# Patient Record
Sex: MALE | Race: BLACK OR AFRICAN AMERICAN | NOT HISPANIC OR LATINO | URBAN - METROPOLITAN AREA
[De-identification: names, ages, dates, MRNs, and addresses within clinical notes are randomized per-mention and may not be internally consistent; named-entity substitution may affect disease eponyms.]

---

## 2023-05-08 ENCOUNTER — OFFICE VISIT (OUTPATIENT)
Dept: PRIMARY CARE | Facility: CLINIC | Age: 68
End: 2023-05-08
Payer: COMMERCIAL

## 2023-05-08 VITALS
SYSTOLIC BLOOD PRESSURE: 114 MMHG | HEART RATE: 73 BPM | BODY MASS INDEX: 20.52 KG/M2 | WEIGHT: 143 LBS | DIASTOLIC BLOOD PRESSURE: 70 MMHG

## 2023-05-08 DIAGNOSIS — Z00.00 HEALTH CARE MAINTENANCE: ICD-10-CM

## 2023-05-08 DIAGNOSIS — N18.31 STAGE 3A CHRONIC KIDNEY DISEASE (MULTI): ICD-10-CM

## 2023-05-08 DIAGNOSIS — Z12.5 SCREENING PSA (PROSTATE SPECIFIC ANTIGEN): ICD-10-CM

## 2023-05-08 DIAGNOSIS — E46 PROTEIN-CALORIE MALNUTRITION, UNSPECIFIED SEVERITY (MULTI): ICD-10-CM

## 2023-05-08 DIAGNOSIS — D70.8 OTHER NEUTROPENIA (CMS-HCC): Primary | ICD-10-CM

## 2023-05-08 DIAGNOSIS — L40.9 PSORIASIS: ICD-10-CM

## 2023-05-08 DIAGNOSIS — J45.20 MILD INTERMITTENT ASTHMA, UNSPECIFIED WHETHER COMPLICATED (HHS-HCC): ICD-10-CM

## 2023-05-08 DIAGNOSIS — I10 BENIGN ESSENTIAL HYPERTENSION: ICD-10-CM

## 2023-05-08 PROBLEM — E27.8 ADRENAL INCIDENTALOMA (MULTI): Status: ACTIVE | Noted: 2021-12-21

## 2023-05-08 PROBLEM — E78.5 DYSLIPIDEMIA: Status: ACTIVE | Noted: 2023-05-08

## 2023-05-08 PROBLEM — E27.8 ADRENAL INCIDENTALOMA (MULTI): Status: ACTIVE | Noted: 2023-05-08

## 2023-05-08 PROBLEM — E27.8 ADRENAL INCIDENTALOMA (MULTI): Status: RESOLVED | Noted: 2021-12-21 | Resolved: 2023-05-08

## 2023-05-08 LAB
ALANINE AMINOTRANSFERASE (SGPT) (U/L) IN SER/PLAS: 29 U/L (ref 10–52)
ALBUMIN (G/DL) IN SER/PLAS: 4.7 G/DL (ref 3.4–5)
ALKALINE PHOSPHATASE (U/L) IN SER/PLAS: 70 U/L (ref 33–136)
ANION GAP IN SER/PLAS: 11 MMOL/L (ref 10–20)
ASPARTATE AMINOTRANSFERASE (SGOT) (U/L) IN SER/PLAS: 28 U/L (ref 9–39)
BILIRUBIN TOTAL (MG/DL) IN SER/PLAS: 1.8 MG/DL (ref 0–1.2)
CALCIUM (MG/DL) IN SER/PLAS: 9.5 MG/DL (ref 8.6–10.6)
CARBON DIOXIDE, TOTAL (MMOL/L) IN SER/PLAS: 26 MMOL/L (ref 21–32)
CHLORIDE (MMOL/L) IN SER/PLAS: 107 MMOL/L (ref 98–107)
CHOLESTEROL (MG/DL) IN SER/PLAS: 124 MG/DL (ref 0–199)
CHOLESTEROL IN HDL (MG/DL) IN SER/PLAS: 46 MG/DL
CHOLESTEROL/HDL RATIO: 2.7
CREATININE (MG/DL) IN SER/PLAS: 1.39 MG/DL (ref 0.5–1.3)
ERYTHROCYTE DISTRIBUTION WIDTH (RATIO) BY AUTOMATED COUNT: 11.6 % (ref 11.5–14.5)
ERYTHROCYTE MEAN CORPUSCULAR HEMOGLOBIN CONCENTRATION (G/DL) BY AUTOMATED: 31.7 G/DL (ref 32–36)
ERYTHROCYTE MEAN CORPUSCULAR VOLUME (FL) BY AUTOMATED COUNT: 94 FL (ref 80–100)
ERYTHROCYTES (10*6/UL) IN BLOOD BY AUTOMATED COUNT: 4 X10E12/L (ref 4.5–5.9)
GFR MALE: 55 ML/MIN/1.73M2
GLUCOSE (MG/DL) IN SER/PLAS: 95 MG/DL (ref 74–99)
HEMATOCRIT (%) IN BLOOD BY AUTOMATED COUNT: 37.5 % (ref 41–52)
HEMOGLOBIN (G/DL) IN BLOOD: 11.9 G/DL (ref 13.5–17.5)
LDL: 67 MG/DL (ref 0–99)
LEUKOCYTES (10*3/UL) IN BLOOD BY AUTOMATED COUNT: 2.8 X10E9/L (ref 4.4–11.3)
NRBC (PER 100 WBCS) BY AUTOMATED COUNT: 0 /100 WBC (ref 0–0)
PLATELETS (10*3/UL) IN BLOOD AUTOMATED COUNT: 194 X10E9/L (ref 150–450)
POTASSIUM (MMOL/L) IN SER/PLAS: 4.4 MMOL/L (ref 3.5–5.3)
PROSTATE SPECIFIC ANTIGEN,SCREEN: 3.36 NG/ML (ref 0–4)
PROTEIN TOTAL: 7.6 G/DL (ref 6.4–8.2)
SODIUM (MMOL/L) IN SER/PLAS: 140 MMOL/L (ref 136–145)
THYROTROPIN (MIU/L) IN SER/PLAS BY DETECTION LIMIT <= 0.05 MIU/L: 2.33 MIU/L (ref 0.44–3.98)
TRIGLYCERIDE (MG/DL) IN SER/PLAS: 53 MG/DL (ref 0–149)
UREA NITROGEN (MG/DL) IN SER/PLAS: 18 MG/DL (ref 6–23)
VLDL: 11 MG/DL (ref 0–40)

## 2023-05-08 PROCEDURE — 84153 ASSAY OF PSA TOTAL: CPT

## 2023-05-08 PROCEDURE — 1036F TOBACCO NON-USER: CPT | Performed by: INTERNAL MEDICINE

## 2023-05-08 PROCEDURE — 85027 COMPLETE CBC AUTOMATED: CPT

## 2023-05-08 PROCEDURE — 84443 ASSAY THYROID STIM HORMONE: CPT

## 2023-05-08 PROCEDURE — 3074F SYST BP LT 130 MM HG: CPT | Performed by: INTERNAL MEDICINE

## 2023-05-08 PROCEDURE — 80061 LIPID PANEL: CPT

## 2023-05-08 PROCEDURE — 99214 OFFICE O/P EST MOD 30 MIN: CPT | Performed by: INTERNAL MEDICINE

## 2023-05-08 PROCEDURE — 1159F MED LIST DOCD IN RCRD: CPT | Performed by: INTERNAL MEDICINE

## 2023-05-08 PROCEDURE — 80053 COMPREHEN METABOLIC PANEL: CPT

## 2023-05-08 PROCEDURE — 3078F DIAST BP <80 MM HG: CPT | Performed by: INTERNAL MEDICINE

## 2023-05-08 PROCEDURE — 1160F RVW MEDS BY RX/DR IN RCRD: CPT | Performed by: INTERNAL MEDICINE

## 2023-05-08 RX ORDER — AMLODIPINE BESYLATE 5 MG/1
5 TABLET ORAL
COMMUNITY
Start: 2022-06-17 | End: 2023-05-09 | Stop reason: SDUPTHER

## 2023-05-08 RX ORDER — ATORVASTATIN CALCIUM 40 MG/1
40 TABLET, FILM COATED ORAL NIGHTLY
COMMUNITY
End: 2023-08-14 | Stop reason: SDUPTHER

## 2023-05-08 RX ORDER — ALBUTEROL SULFATE 90 UG/1
2 AEROSOL, METERED RESPIRATORY (INHALATION) EVERY 6 HOURS PRN
COMMUNITY
Start: 2019-03-12

## 2023-05-08 RX ORDER — EPINEPHRINE 0.3 MG/.3ML
0.3 INJECTION SUBCUTANEOUS AS NEEDED
COMMUNITY
Start: 2014-05-24

## 2023-05-08 RX ORDER — TRIAMCINOLONE ACETONIDE 1 MG/G
1 OINTMENT TOPICAL 2 TIMES DAILY
COMMUNITY
Start: 2022-09-21

## 2023-05-08 ASSESSMENT — PATIENT HEALTH QUESTIONNAIRE - PHQ9
2. FEELING DOWN, DEPRESSED OR HOPELESS: NOT AT ALL
1. LITTLE INTEREST OR PLEASURE IN DOING THINGS: NOT AT ALL
SUM OF ALL RESPONSES TO PHQ9 QUESTIONS 1 AND 2: 0

## 2023-05-08 NOTE — PROGRESS NOTES
Subjective   Patient ID: Arturo Gallego is a 68 y.o. male who presents for Follow-up.    HPI     Patient is a 68-year-old male with past medical history as below hypertension dyslipidemia who presents for follow-up.  Patient is compliant with his medications denies chest pain palpitations orthopnea    He has chronic kidney disease stage III and is due for renal function test.  No leg swelling.  No NSAID use     History of neutropenia.  Check CBC no recent infections.  No recent hospitalizations.    Asthma mild intermittent no recent exacerbations and has not been using his alb inhailer as not needed     Review of Systems  Constitutional: No fever or chills  Cardiovascular: no chest pain, no palpitations and no syncope.   Respiratory: no cough, no shortness of breath during exertion and no shortness of breath at rest.   Gastrointestinal: no abdominal pain, no nausea and no vomiting.  Neuro: No Headache, no dizziness    Objective   /70   Pulse 73   Wt 64.9 kg (143 lb)   BMI 20.52 kg/m²     Physical Exam  Constitutional: Alert and in no acute distress. Well developed, well nourished  Head and Face: Head and face: Normal.    Cardiovascular: Heart rate and rhythm were normal, normal S1 and S2. No peripheral edema.   Pulmonary: No respiratory distress. Clear bilateral breath sounds.  Musculoskeletal: Gait and station: Normal. Muscle strength/tone: Normal.   Skin: Normal skin color and pigmentation, normal skin turgor, and no rash.    Psychiatric: Judgment and insight: Intact. Mood and affect: Normal.        No results found for: WBC, HGB, HCT, PLT, CHOL, TRIG, HDL, LDLDIRECT, ALT, AST, NA, K, CL, CREATININE, BUN, CO2, TSH, PSA, INR, GLUF, HGBA1C, ALBUR    FOOT  Narrative: Interpreted By:  ROSE MARIE WEBB MD, MEKA  MRN: [id number]  Patient Name: ARTURO GALLEGO     STUDY:  FOOT; COMPLETE, MIN 3 VIEWS; Left;  2/15/2023 10:41 am     INDICATION:  STEPPED ON GLASS .     COMPARISON:  None.     ACCESSION  NUMBER(S):  [id number]     ORDERING CLINICIAN:  AMBREEN KENYON     FINDINGS:  AP, oblique and lateral views were obtained. No fracture or  dislocation is noted. No radiopaque foreign bodies are noted in the  soft tissues.     Impression: No acute osseous abnormality and no radiopaque foreign body               Assessment/Plan   Problem List Items Addressed This Visit          Respiratory    Asthma     Stable   No recent flares            Circulatory    Benign essential hypertension     Controlled on current meds            Genitourinary    Stage 3a chronic kidney disease     Avoid nsaid use  Drink 1.5 L fluid daily  Check rfp          Relevant Orders    CBC    Comprehensive metabolic panel    Lipid Panel    TSH with reflex to Free T4 if abnormal    Prostate Spec.Ag,Screen       Endocrine/Metabolic    Protein-calorie malnutrition, unspecified severity (CMS/HCC)    Relevant Orders    CBC    Comprehensive metabolic panel    Lipid Panel    TSH with reflex to Free T4 if abnormal    Prostate Spec.Ag,Screen       Immune    Psoriasis     Cont triamcinolone cream as needed            Other    Neutropenia (CMS/HCC) - Primary     Check cbc          Relevant Orders    CBC    Comprehensive metabolic panel    Lipid Panel    TSH with reflex to Free T4 if abnormal    Prostate Spec.Ag,Screen     Other Visit Diagnoses       Screening PSA (prostate specific antigen)        Relevant Orders    Prostate Spec.Ag,Screen    Health care maintenance        Relevant Orders    CBC    Comprehensive metabolic panel    Lipid Panel    TSH with reflex to Free T4 if abnormal    Prostate Spec.Ag,Screen

## 2023-05-09 DIAGNOSIS — I10 BENIGN ESSENTIAL HYPERTENSION: ICD-10-CM

## 2023-05-09 DIAGNOSIS — D70.9 NEUTROPENIA, UNSPECIFIED TYPE (CMS-HCC): Primary | ICD-10-CM

## 2023-05-09 RX ORDER — AMLODIPINE BESYLATE 5 MG/1
5 TABLET ORAL
Qty: 90 TABLET | Refills: 1 | Status: SHIPPED | OUTPATIENT
Start: 2023-05-09 | End: 2023-08-14 | Stop reason: SDUPTHER

## 2023-05-10 RX ORDER — AMLODIPINE BESYLATE 5 MG/1
TABLET ORAL
Qty: 90 TABLET | Refills: 0 | OUTPATIENT
Start: 2023-05-10

## 2023-08-14 ENCOUNTER — OFFICE VISIT (OUTPATIENT)
Dept: PRIMARY CARE | Facility: CLINIC | Age: 68
End: 2023-08-14
Payer: COMMERCIAL

## 2023-08-14 VITALS
SYSTOLIC BLOOD PRESSURE: 111 MMHG | HEART RATE: 72 BPM | BODY MASS INDEX: 20.37 KG/M2 | DIASTOLIC BLOOD PRESSURE: 72 MMHG | WEIGHT: 142 LBS

## 2023-08-14 DIAGNOSIS — I10 BENIGN ESSENTIAL HYPERTENSION: Primary | ICD-10-CM

## 2023-08-14 DIAGNOSIS — D70.9 NEUTROPENIA, UNSPECIFIED TYPE (CMS-HCC): ICD-10-CM

## 2023-08-14 DIAGNOSIS — Z23 NEED FOR VACCINATION AGAINST STREPTOCOCCUS PNEUMONIAE: ICD-10-CM

## 2023-08-14 DIAGNOSIS — N18.31 STAGE 3A CHRONIC KIDNEY DISEASE (MULTI): ICD-10-CM

## 2023-08-14 DIAGNOSIS — M25.511 ACUTE PAIN OF RIGHT SHOULDER: ICD-10-CM

## 2023-08-14 PROCEDURE — G0009 ADMIN PNEUMOCOCCAL VACCINE: HCPCS | Performed by: INTERNAL MEDICINE

## 2023-08-14 PROCEDURE — 1159F MED LIST DOCD IN RCRD: CPT | Performed by: INTERNAL MEDICINE

## 2023-08-14 PROCEDURE — 3078F DIAST BP <80 MM HG: CPT | Performed by: INTERNAL MEDICINE

## 2023-08-14 PROCEDURE — 1036F TOBACCO NON-USER: CPT | Performed by: INTERNAL MEDICINE

## 2023-08-14 PROCEDURE — 3074F SYST BP LT 130 MM HG: CPT | Performed by: INTERNAL MEDICINE

## 2023-08-14 PROCEDURE — 1160F RVW MEDS BY RX/DR IN RCRD: CPT | Performed by: INTERNAL MEDICINE

## 2023-08-14 PROCEDURE — 90677 PCV20 VACCINE IM: CPT | Performed by: INTERNAL MEDICINE

## 2023-08-14 PROCEDURE — 99213 OFFICE O/P EST LOW 20 MIN: CPT | Performed by: INTERNAL MEDICINE

## 2023-08-14 RX ORDER — METHYLPREDNISOLONE 4 MG/1
TABLET ORAL
Qty: 21 TABLET | Refills: 0 | Status: SHIPPED | OUTPATIENT
Start: 2023-08-14 | End: 2023-08-21

## 2023-08-14 RX ORDER — ATORVASTATIN CALCIUM 40 MG/1
40 TABLET, FILM COATED ORAL NIGHTLY
Qty: 90 TABLET | Refills: 3 | Status: SHIPPED | OUTPATIENT
Start: 2023-08-14 | End: 2024-04-01 | Stop reason: SDUPTHER

## 2023-08-14 RX ORDER — AMLODIPINE BESYLATE 5 MG/1
5 TABLET ORAL
Qty: 90 TABLET | Refills: 1 | Status: SHIPPED | OUTPATIENT
Start: 2023-08-14 | End: 2024-04-04

## 2023-08-14 ASSESSMENT — PATIENT HEALTH QUESTIONNAIRE - PHQ9
1. LITTLE INTEREST OR PLEASURE IN DOING THINGS: NOT AT ALL
2. FEELING DOWN, DEPRESSED OR HOPELESS: NOT AT ALL
SUM OF ALL RESPONSES TO PHQ9 QUESTIONS 1 AND 2: 0

## 2023-08-14 NOTE — PROGRESS NOTES
Subjective   Patient ID: Arturo Gomes is a 68 y.o. male who presents for Follow-up (Rt shoulder issues).    HPI     Patient is a 68-year-old male with past medical history as below hypertension dyslipidemia who presents for follow-up.    He has chronic kidney disease stage III . No leg swelling.  No NSAID use     History of neutropenia.  Check CBC no recent infections.  No recent hospitalizations.  He has established with hematology and has follow-up in 1 month    Asthma mild intermittent no recent exacerbations and has not been using his alb inhailer as not needed     Patient reports right-sided shoulder pain.  It began 3 weeks ago.  After waking up.  No trauma or injury.  He states that there is pain when he raises shoulder above 90 degrees.  There is point tenderness over the bicep tendon    Review of Systems  Constitutional: No fever or chills  Cardiovascular: no chest pain, no palpitations and no syncope.   Respiratory: no cough, no shortness of breath during exertion and no shortness of breath at rest.   Gastrointestinal: no abdominal pain, no nausea and no vomiting.  Neuro: No Headache, no dizziness  MSK right shoulder pain    Objective   /72   Pulse 72   Wt 64.4 kg (142 lb)   BMI 20.37 kg/m²     Physical Exam  Constitutional: Alert and in no acute distress. Well developed, well nourished  Head and Face: Head and face: Normal.    Cardiovascular: Heart rate and rhythm were normal, normal S1 and S2. No peripheral edema.   Pulmonary: No respiratory distress. Clear bilateral breath sounds.  Musculoskeletal: Gait and station: Normal. Muscle strength/tone: Normal.   Skin: Normal skin color and pigmentation, normal skin turgor, and no rash.    Psychiatric: Judgment and insight: Intact. Mood and affect: Normal.        Lab Results   Component Value Date    WBC 3.5 (L) 08/10/2023    HGB 11.7 (L) 08/10/2023    HCT 36.6 (L) 08/10/2023     08/10/2023    CHOL 124 05/08/2023    TRIG 53 05/08/2023    HDL  46.0 05/08/2023    ALT 30 08/10/2023    AST 29 08/10/2023     08/10/2023    K 3.7 08/10/2023     08/10/2023    CREATININE 1.34 (H) 08/10/2023    BUN 9 08/10/2023    CO2 26 08/10/2023    TSH 2.33 05/08/2023       FOOT  Narrative: Interpreted By:  ROSE MARIE WEBB MD, MEKA  MRN: [id number]  Patient Name: LAURENCE GALLEGO     STUDY:  FOOT; COMPLETE, MIN 3 VIEWS; Left;  2/15/2023 10:41 am     INDICATION:  STEPPED ON GLASS .     COMPARISON:  None.     ACCESSION NUMBER(S):  [id number]     ORDERING CLINICIAN:  AMBREEN KENYON     FINDINGS:  AP, oblique and lateral views were obtained. No fracture or  dislocation is noted. No radiopaque foreign bodies are noted in the  soft tissues.     Impression: No acute osseous abnormality and no radiopaque foreign body               Assessment/Plan   Problem List Items Addressed This Visit          Cardiac and Vasculature    Benign essential hypertension - Primary     Controlled on current meds         Relevant Medications    amLODIPine (Norvasc) 5 mg tablet    atorvastatin (Lipitor) 40 mg tablet       Genitourinary and Reproductive    Stage 3a chronic kidney disease (CMS/HCC)     Avoid nsaid use  Drink 1.5 L fluid daily  Check ultrasound kidney         Relevant Medications    atorvastatin (Lipitor) 40 mg tablet    Other Relevant Orders    US renal complete       Hematology and Neoplasia    Neutropenia (CMS/HCC)     Currently being worked up by hematology            Musculoskeletal and Injuries    Acute pain of right shoulder     Check imaging.  Start Medrol.  If no improvement can consider physical therapy.         Relevant Medications    methylPREDNISolone (Medrol Dospak) 4 mg tablets    Other Relevant Orders    XR shoulder right 2+ views     Other Visit Diagnoses       Need for vaccination against Streptococcus pneumoniae        Relevant Orders    Pneumococcal conjugate vaccine, 20-valent, adult (PREVNAR 20)

## 2023-10-18 ENCOUNTER — HOSPITAL ENCOUNTER (EMERGENCY)
Facility: HOSPITAL | Age: 68
Discharge: HOME | End: 2023-10-18
Payer: COMMERCIAL

## 2023-10-18 VITALS
SYSTOLIC BLOOD PRESSURE: 148 MMHG | WEIGHT: 147 LBS | RESPIRATION RATE: 18 BRPM | HEART RATE: 61 BPM | BODY MASS INDEX: 21.05 KG/M2 | OXYGEN SATURATION: 100 % | DIASTOLIC BLOOD PRESSURE: 83 MMHG | HEIGHT: 70 IN | TEMPERATURE: 97.8 F

## 2023-10-18 DIAGNOSIS — L02.31 ABSCESS OF BUTTOCK, RIGHT: Primary | ICD-10-CM

## 2023-10-18 PROCEDURE — 99283 EMERGENCY DEPT VISIT LOW MDM: CPT

## 2023-10-18 RX ORDER — SULFAMETHOXAZOLE AND TRIMETHOPRIM 800; 160 MG/1; MG/1
1 TABLET ORAL EVERY 12 HOURS
Qty: 10 TABLET | Refills: 0 | Status: SHIPPED | OUTPATIENT
Start: 2023-10-18 | End: 2023-10-23

## 2023-10-18 RX ORDER — IBUPROFEN 600 MG/1
600 TABLET ORAL EVERY 6 HOURS PRN
Qty: 28 TABLET | Refills: 0 | Status: SHIPPED | OUTPATIENT
Start: 2023-10-18 | End: 2023-10-25

## 2023-10-18 ASSESSMENT — COLUMBIA-SUICIDE SEVERITY RATING SCALE - C-SSRS
2. HAVE YOU ACTUALLY HAD ANY THOUGHTS OF KILLING YOURSELF?: NO
1. IN THE PAST MONTH, HAVE YOU WISHED YOU WERE DEAD OR WISHED YOU COULD GO TO SLEEP AND NOT WAKE UP?: NO
6. HAVE YOU EVER DONE ANYTHING, STARTED TO DO ANYTHING, OR PREPARED TO DO ANYTHING TO END YOUR LIFE?: NO

## 2023-10-18 ASSESSMENT — PAIN DESCRIPTION - LOCATION: LOCATION: RECTUM

## 2023-10-18 ASSESSMENT — PAIN SCALES - GENERAL: PAINLEVEL_OUTOF10: 7

## 2023-10-18 ASSESSMENT — PAIN - FUNCTIONAL ASSESSMENT: PAIN_FUNCTIONAL_ASSESSMENT: 0-10

## 2023-10-18 NOTE — ED NOTES
Patient ready for discharge. Reviewed discharge instructions with patient. Patient states no questions or concerns at this time. Informed patient of follow-up information. Prescription given to patient. Proper medication administration reviewed and patient states no questions associated with prescription. Patient ambulatory from ED, VSS, NAD noted at this time, ABC's intact.      Fly Hsieh RN  10/18/23 [id number]

## 2023-10-18 NOTE — ED PROVIDER NOTES
HPI   Chief Complaint   Patient presents with    Rectal Pain       This is a 68-year-old male who presents with a complaint of rectal pain he is concerned about a hemorrhoid but never had 1 in the past states his symptoms started 845 this morning.  Nothing makes it better or worse.  Denies other complaints.  No drainage no blood in his stool no constipation no nausea or vomiting                          No data recorded                Patient History   No past medical history on file.  No past surgical history on file.  No family history on file.  Social History     Tobacco Use    Smoking status: Never     Passive exposure: Never    Smokeless tobacco: Never   Substance Use Topics    Alcohol use: Never    Drug use: Never       Physical Exam   ED Triage Vitals [10/18/23 1036]   Temp Heart Rate Resp BP   36.6 °C (97.8 °F) 69 20 133/85      SpO2 Temp Source Heart Rate Source Patient Position   98 % Temporal Monitor Sitting      BP Location FiO2 (%)     Left arm --       Physical Exam  Vitals reviewed.   Constitutional:       General: He is not in acute distress.     Appearance: Normal appearance. He is normal weight. He is not ill-appearing, toxic-appearing or diaphoretic.   HENT:      Head: Normocephalic and atraumatic.      Right Ear: External ear normal.      Left Ear: External ear normal.      Nose: Nose normal.      Mouth/Throat:      Mouth: Mucous membranes are moist.   Eyes:      Extraocular Movements: Extraocular movements intact.      Conjunctiva/sclera: Conjunctivae normal.      Pupils: Pupils are equal, round, and reactive to light.   Pulmonary:      Effort: Pulmonary effort is normal. No respiratory distress.      Breath sounds: No stridor.   Abdominal:      General: There is no distension.   Musculoskeletal:         General: No swelling or deformity.      Cervical back: Normal range of motion. No tenderness.   Skin:     General: Skin is warm.      Capillary Refill: Capillary refill takes less than 2  seconds.      Findings: Abscess present. No rash.          Neurological:      General: No focal deficit present.      Mental Status: He is alert and oriented to person, place, and time. Mental status is at baseline.   Psychiatric:         Mood and Affect: Mood normal.         Behavior: Behavior normal.         Thought Content: Thought content normal.         Judgment: Judgment normal.         ED Course & MDM   Diagnoses as of 10/18/23 1112   Abscess of buttock, right       Medical Decision Making  Ddx: abscess, hemorrhoid, fissure.     Exam shows no hemorrhoid    Discussed abscess care, antibiotics, and followup.         Procedure  Procedures     Sanford Patiño PA-C  10/18/23 1124       Sanford Patiño PA-C  10/18/23 1127

## 2023-10-19 ENCOUNTER — TELEPHONE (OUTPATIENT)
Dept: HEMATOLOGY/ONCOLOGY | Facility: CLINIC | Age: 68
End: 2023-10-19
Payer: COMMERCIAL

## 2023-10-19 NOTE — TELEPHONE ENCOUNTER
Called pt to inquire about labs that were ordered on 9/14. It does not appear he had these drawn. I asked him to call me back to see if he had them drawn elsewhere or if he needs to come in for a lab appt. The actual orders are in the old EMR and will need to be reentered in Epic if pt chooses to come in.

## 2023-11-03 PROBLEM — D35.00 ADRENAL ADENOMA: Status: ACTIVE | Noted: 2023-11-03

## 2023-11-03 RX ORDER — ALBUTEROL SULFATE 90 UG/1
1 AEROSOL, METERED RESPIRATORY (INHALATION) EVERY 4 HOURS PRN
COMMUNITY
Start: 2019-03-12

## 2023-11-03 RX ORDER — LORATADINE 10 MG/1
10 TABLET ORAL DAILY PRN
COMMUNITY
Start: 2019-11-12

## 2023-11-06 ENCOUNTER — OFFICE VISIT (OUTPATIENT)
Dept: HEMATOLOGY/ONCOLOGY | Facility: HOSPITAL | Age: 68
End: 2023-11-06
Payer: COMMERCIAL

## 2023-11-06 ENCOUNTER — LAB (OUTPATIENT)
Dept: LAB | Facility: HOSPITAL | Age: 68
End: 2023-11-06
Payer: COMMERCIAL

## 2023-11-06 VITALS
BODY MASS INDEX: 22.68 KG/M2 | HEIGHT: 67 IN | OXYGEN SATURATION: 100 % | RESPIRATION RATE: 17 BRPM | TEMPERATURE: 97.9 F | DIASTOLIC BLOOD PRESSURE: 99 MMHG | SYSTOLIC BLOOD PRESSURE: 141 MMHG | WEIGHT: 144.5 LBS | HEART RATE: 80 BPM

## 2023-11-06 DIAGNOSIS — D72.810 LYMPHOPENIA: ICD-10-CM

## 2023-11-06 DIAGNOSIS — D64.9 ANEMIA, UNSPECIFIED TYPE: ICD-10-CM

## 2023-11-06 DIAGNOSIS — D72.810 LYMPHOPENIA: Primary | ICD-10-CM

## 2023-11-06 LAB
BASOPHILS # BLD AUTO: 0.03 X10*3/UL (ref 0–0.1)
BASOPHILS NFR BLD AUTO: 0.8 %
EOSINOPHIL # BLD AUTO: 0.05 X10*3/UL (ref 0–0.7)
EOSINOPHIL NFR BLD AUTO: 1.3 %
ERYTHROCYTE [DISTWIDTH] IN BLOOD BY AUTOMATED COUNT: 11.7 % (ref 11.5–14.5)
HCT VFR BLD AUTO: 36.1 % (ref 41–52)
HGB BLD-MCNC: 12.2 G/DL (ref 13.5–17.5)
HGB RETIC QN: 34 PG (ref 28–38)
HIV 1+2 AB+HIV1 P24 AG SERPL QL IA: NONREACTIVE
HOLD SPECIMEN: NORMAL
IMM GRANULOCYTES # BLD AUTO: 0.01 X10*3/UL (ref 0–0.7)
IMM GRANULOCYTES NFR BLD AUTO: 0.3 % (ref 0–0.9)
IMMATURE RETIC FRACTION: 10.3 %
LYMPHOCYTES # BLD AUTO: 0.71 X10*3/UL (ref 1.2–4.8)
LYMPHOCYTES NFR BLD AUTO: 18.1 %
MCH RBC QN AUTO: 30.7 PG (ref 26–34)
MCHC RBC AUTO-ENTMCNC: 33.8 G/DL (ref 32–36)
MCV RBC AUTO: 91 FL (ref 80–100)
MONOCYTES # BLD AUTO: 0.24 X10*3/UL (ref 0.1–1)
MONOCYTES NFR BLD AUTO: 6.1 %
NEUTROPHILS # BLD AUTO: 2.89 X10*3/UL (ref 1.2–7.7)
NEUTROPHILS NFR BLD AUTO: 73.4 %
NRBC BLD-RTO: 0 /100 WBCS (ref 0–0)
PLATELET # BLD AUTO: 193 X10*3/UL (ref 150–450)
RBC # BLD AUTO: 3.97 X10*6/UL (ref 4.5–5.9)
RETICS #: 0.05 X10*6/UL (ref 0.02–0.11)
RETICS/RBC NFR AUTO: 1.3 % (ref 0.5–2)
WBC # BLD AUTO: 3.9 X10*3/UL (ref 4.4–11.3)

## 2023-11-06 PROCEDURE — 1126F AMNT PAIN NOTED NONE PRSNT: CPT | Performed by: STUDENT IN AN ORGANIZED HEALTH CARE EDUCATION/TRAINING PROGRAM

## 2023-11-06 PROCEDURE — 99213 OFFICE O/P EST LOW 20 MIN: CPT | Mod: 25 | Performed by: STUDENT IN AN ORGANIZED HEALTH CARE EDUCATION/TRAINING PROGRAM

## 2023-11-06 PROCEDURE — 99213 OFFICE O/P EST LOW 20 MIN: CPT | Performed by: STUDENT IN AN ORGANIZED HEALTH CARE EDUCATION/TRAINING PROGRAM

## 2023-11-06 PROCEDURE — 1160F RVW MEDS BY RX/DR IN RCRD: CPT | Performed by: STUDENT IN AN ORGANIZED HEALTH CARE EDUCATION/TRAINING PROGRAM

## 2023-11-06 PROCEDURE — 87389 HIV-1 AG W/HIV-1&-2 AB AG IA: CPT

## 2023-11-06 PROCEDURE — 88184 FLOWCYTOMETRY/ TC 1 MARKER: CPT | Mod: TC | Performed by: STUDENT IN AN ORGANIZED HEALTH CARE EDUCATION/TRAINING PROGRAM

## 2023-11-06 PROCEDURE — 88189 FLOWCYTOMETRY/READ 16 & >: CPT | Performed by: PATHOLOGY

## 2023-11-06 PROCEDURE — 1036F TOBACCO NON-USER: CPT | Performed by: STUDENT IN AN ORGANIZED HEALTH CARE EDUCATION/TRAINING PROGRAM

## 2023-11-06 PROCEDURE — 88187 FLOWCYTOMETRY/READ 2-8: CPT | Performed by: PATHOLOGY

## 2023-11-06 PROCEDURE — 3077F SYST BP >= 140 MM HG: CPT | Performed by: STUDENT IN AN ORGANIZED HEALTH CARE EDUCATION/TRAINING PROGRAM

## 2023-11-06 PROCEDURE — 85045 AUTOMATED RETICULOCYTE COUNT: CPT

## 2023-11-06 PROCEDURE — 85025 COMPLETE CBC W/AUTO DIFF WBC: CPT

## 2023-11-06 PROCEDURE — 88188 FLOWCYTOMETRY/READ 9-15: CPT | Performed by: PATHOLOGY

## 2023-11-06 PROCEDURE — 1159F MED LIST DOCD IN RCRD: CPT | Performed by: STUDENT IN AN ORGANIZED HEALTH CARE EDUCATION/TRAINING PROGRAM

## 2023-11-06 PROCEDURE — 36415 COLL VENOUS BLD VENIPUNCTURE: CPT

## 2023-11-06 PROCEDURE — 3080F DIAST BP >= 90 MM HG: CPT | Performed by: STUDENT IN AN ORGANIZED HEALTH CARE EDUCATION/TRAINING PROGRAM

## 2023-11-06 ASSESSMENT — ENCOUNTER SYMPTOMS
LOSS OF SENSATION IN FEET: 0
DEPRESSION: 0
OCCASIONAL FEELINGS OF UNSTEADINESS: 0

## 2023-11-06 ASSESSMENT — PAIN SCALES - GENERAL: PAINLEVEL: 0-NO PAIN

## 2023-11-06 NOTE — PROGRESS NOTES
Patient ID: Arturo Gomes is a 68 y.o. male.  Referring Physician: No referring provider defined for this encounter.  Primary Care Provider: Larry Ansari DO  Visit Type: Follow Up  Diagnosis: anemia, neutropenia and lymphopenia       Subjective    HPI  68 y.o. male with a PMH significant for CKD, asthma, HTN, psoriasis (topical cream) and adrenal incidentaloma who is referred for eval of anemia and neutropenia.      Pt is asymptomatic. No B s/s, masses, no increased frequency of infections. Does not follow anyone for CKD.   Has 1 CBC, no diff on file which shows mild anemia and leukopenia. No h/o same per pt. No bleeding.   Last colo was 2yrs back and was WNL.   Mixed diet, never smoker, no ETOH. No personal h/o cancer, FMH 2 sisters breast cancer 50yrs and other 42yrs, no blood disease.   No RDA.   No CT/RT/Toxins.    Not on steroids/immunosuppressives and no new medications, herbs.     9/14/2023: pt presents for follow up.  11/06/23: no new complaints, did not get labs done after last visit. No increased frequency of infections.    Review of Systems - Oncology  10 point review of systems negative except as stated in HPI    Objective   Active Ambulatory Problems     Diagnosis Date Noted    Neutropenia (CMS/HCC) 02/02/2011    Protein-calorie malnutrition, unspecified severity (CMS/HCC) 05/08/2023    Stage 3a chronic kidney disease (CMS/HCC) 12/21/2021    Asthma 04/25/2005    Benign essential hypertension 12/21/2021    Dyslipidemia 05/08/2023    Mild intermittent asthma without complication 09/07/2021    Psoriasis 05/08/2023    Acute pain of right shoulder 08/14/2023    Adrenal adenoma 11/03/2023     Resolved Ambulatory Problems     Diagnosis Date Noted    Adrenal incidentaloma (CMS/HCC) 12/21/2021     No Additional Past Medical History     No past surgical history on file.    Social history:   Last colo was 2yrs back and was WNL.   Mixed diet, never smoker, no ETOH. No personal h/o cancer, FMH 2 sisters breast  "cancer 50yrs and other 42yrs, no blood disease.   No RDA.   No CT/RT/Toxins.    Oncology History    No history exists.       Physical Exam  Vitals reviewed.   Constitutional:       Appearance: Normal appearance.   HENT:      Head: Normocephalic and atraumatic.   Eyes:      Comments: Pallor +, no icterus    Neck:      Comments: No cervical or axillary adenopathy   Cardiovascular:      Rate and Rhythm: Normal rate and regular rhythm.      Heart sounds: No murmur heard.  Pulmonary:      Breath sounds: Normal breath sounds.   Abdominal:      General: Abdomen is flat. There is no distension.      Palpations: Abdomen is soft.      Tenderness: There is no abdominal tenderness.   Musculoskeletal:      Comments: No pedal edema    Neurological:      Mental Status: He is alert.       BSA: 1.76 meters squared  BP (!) 141/99   Pulse 80   Temp 36.6 °C (97.9 °F) (Skin)   Resp 17   Ht (S) 1.699 m (5' 6.89\")   Wt 65.5 kg (144 lb 8 oz)   SpO2 100%   BMI 22.71 kg/m²     Labs:  Lab Results   Component Value Date    WBC 3.5 (L) 08/10/2023    NEUTROABS 2.51 08/10/2023    LYMPHSABS 0.54 (L) 08/10/2023    MONOSABS 0.34 08/10/2023    EOSABS 0.08 08/10/2023    BASOSABS 0.03 08/10/2023    RBC 3.94 (L) 08/10/2023    MCV 93 08/10/2023    MCHC 32.0 08/10/2023    HGB 11.7 (L) 08/10/2023    HCT 36.6 (L) 08/10/2023     08/10/2023     Lab Results   Component Value Date    RETICCTPCT 1.1 08/10/2023      Lab Results   Component Value Date    CREATININE 1.34 (H) 08/10/2023    BUN 9 08/10/2023     08/10/2023    K 3.7 08/10/2023     08/10/2023    CO2 26 08/10/2023      Lab Results   Component Value Date    ALT 30 08/10/2023    AST 29 08/10/2023    ALKPHOS 72 08/10/2023    BILITOT 1.9 (H) 08/10/2023      Lab Results   Component Value Date    TSH 2.33 05/08/2023     Lab Results   Component Value Date    TSH 2.33 05/08/2023     Lab Results   Component Value Date    IRON 43 08/10/2023    TIBC 288 08/10/2023    FERRITIN 205 " "08/10/2023      Lab Results   Component Value Date    LXFYOPHK40 490 08/10/2023      Lab Results   Component Value Date    FOLATE 8.2 08/10/2023     No results found for: \"BUDDY\", \"RF\", \"SEDRATE\"   Lab Results   Component Value Date    CRP <0.10 08/06/2019      No results found for: \"ALOK\"  Lab Results   Component Value Date     08/10/2023     Lab Results   Component Value Date    HAPTOGLOBIN 131 08/10/2023     Lab Results   Component Value Date    SPEP ABNORMAL 08/10/2023     Lab Results   Component Value Date    IGG 1,640 (H) 08/10/2023    IGM 65 08/10/2023     08/10/2023        No components found for: \"PT\"  No results found for: \"APTT\"    Assessment/Plan    68 y.o. male with a PMH significant for CKD, asthma, HTN, psoriasis (topical cream) and adrenal incidentaloma who is referred for eval of anemia and neutropenia. Also noted to have moderate lymphopenia.      Asymptomatic pt, minimal labs in EMR, appears to have Hb ~11 and TLC ~2.9, no diff. No B s/s. No history of these issues. No new meds or immunosuppression. Has had CKD for many years, never seen a nephrologist. Exam was non-contributory. Work up showed  mild anemia, lymphopenia, hypoproiliferative retic response, elevated bili but no other evidence of hemolysis. hep B/C neg, HIV not drawn, myeloma testing from serum is negative, UPEP not done.  Plan:  - Work up for lymphopenia including flow sent back in September but pt did not have them drawn, we did contact him about this after but did not hear back. I cannot see the labs ordered any more due to lack of access to the former EMR. Appeared to have no immunoglobulin deficiency. Exam unchanged and WNL.   - based on labs will call pt re follow up.     Mark Mcclain MD    "

## 2023-11-10 LAB
CELL COUNT (BLOOD): 3.9 X10*3/UL
CELL POPULATIONS: NORMAL
DIAGNOSIS: NORMAL
FLOW DIFFERENTIAL: NORMAL
FLOW TEST ORDERED: NORMAL
LAB TEST METHOD: NORMAL
NUMBER OF CELLS COLLECTED: NORMAL PER TUBE
PATH REPORT.TOTAL CANCER: NORMAL
SIGNATURE COMMENT: NORMAL
SPECIMEN VIABILITY: NORMAL

## 2023-11-13 ENCOUNTER — APPOINTMENT (OUTPATIENT)
Dept: SURGERY | Facility: CLINIC | Age: 68
End: 2023-11-13
Payer: COMMERCIAL

## 2023-11-13 ENCOUNTER — TELEPHONE (OUTPATIENT)
Dept: HEMATOLOGY/ONCOLOGY | Facility: HOSPITAL | Age: 68
End: 2023-11-13

## 2023-11-13 NOTE — TELEPHONE ENCOUNTER
----- Message from Mark Urias MD sent at 11/10/2023  4:15 PM EST -----  Please call the patient regarding his labs, HIV is negative, work up for a primary blood process causing decreased lymphocytes is negative. His Hb has improved. Based on the fact that his lymphopenia has improved, and he remains asymptomatic. Will hold off on further testing at this time. He can follow up with his PCP. I can see him back if needed, if he is comfortable with this plan, we do not need to schedule follow up. Thanks

## 2023-11-13 NOTE — TELEPHONE ENCOUNTER
Attempted to call patient to relay message but there was no answer. Left message on voicemail to call office back.

## 2024-02-14 ENCOUNTER — OFFICE VISIT (OUTPATIENT)
Dept: PRIMARY CARE | Facility: CLINIC | Age: 69
End: 2024-02-14
Payer: COMMERCIAL

## 2024-02-14 VITALS
SYSTOLIC BLOOD PRESSURE: 117 MMHG | OXYGEN SATURATION: 96 % | HEART RATE: 63 BPM | WEIGHT: 140 LBS | BODY MASS INDEX: 22 KG/M2 | DIASTOLIC BLOOD PRESSURE: 71 MMHG

## 2024-02-14 DIAGNOSIS — N18.31 STAGE 3A CHRONIC KIDNEY DISEASE (MULTI): ICD-10-CM

## 2024-02-14 DIAGNOSIS — Z12.11 SCREEN FOR COLON CANCER: ICD-10-CM

## 2024-02-14 DIAGNOSIS — M62.838 MUSCLE SPASM: ICD-10-CM

## 2024-02-14 DIAGNOSIS — E46 PROTEIN-CALORIE MALNUTRITION, UNSPECIFIED SEVERITY (MULTI): ICD-10-CM

## 2024-02-14 DIAGNOSIS — M62.838 MUSCLE SPASM: Primary | ICD-10-CM

## 2024-02-14 DIAGNOSIS — D70.9 NEUTROPENIA, UNSPECIFIED TYPE (CMS-HCC): ICD-10-CM

## 2024-02-14 PROCEDURE — 1160F RVW MEDS BY RX/DR IN RCRD: CPT | Performed by: INTERNAL MEDICINE

## 2024-02-14 PROCEDURE — 1036F TOBACCO NON-USER: CPT | Performed by: INTERNAL MEDICINE

## 2024-02-14 PROCEDURE — 3078F DIAST BP <80 MM HG: CPT | Performed by: INTERNAL MEDICINE

## 2024-02-14 PROCEDURE — 99213 OFFICE O/P EST LOW 20 MIN: CPT | Performed by: INTERNAL MEDICINE

## 2024-02-14 PROCEDURE — 1126F AMNT PAIN NOTED NONE PRSNT: CPT | Performed by: INTERNAL MEDICINE

## 2024-02-14 PROCEDURE — 3074F SYST BP LT 130 MM HG: CPT | Performed by: INTERNAL MEDICINE

## 2024-02-14 PROCEDURE — 1123F ACP DISCUSS/DSCN MKR DOCD: CPT | Performed by: INTERNAL MEDICINE

## 2024-02-14 PROCEDURE — 1159F MED LIST DOCD IN RCRD: CPT | Performed by: INTERNAL MEDICINE

## 2024-02-14 RX ORDER — METHOCARBAMOL 500 MG/1
500 TABLET, FILM COATED ORAL 4 TIMES DAILY PRN
Qty: 21 TABLET | Refills: 0 | Status: SHIPPED | OUTPATIENT
Start: 2024-02-14 | End: 2024-02-15

## 2024-02-14 NOTE — PROGRESS NOTES
Subjective   Patient ID: Arturo Gomes is a 69 y.o. male who presents for Follow-up and Spasms.    HPI     Patient is a 68-year-old male with past medical history as below hypertension dyslipidemia who presents for sick visit    Patient states that his daughter was recently moving and he was helping.  He was lifting heavy boxes.  He developed lower back pain he cannot take ibuprofen due to chronic kidney disease stage III.  He is looking for medication to help alleviate his pain.  Pain is dull and achy and 4 out of 10 in intensity and affecting the lower back    He has chronic kidney disease stage III . No leg swelling.  No NSAID use     History of neutropenia.  Check CBC no recent infections.  No recent hospitalizations.  He has established with hematology and has follow-up in 1 month    Asthma mild intermittent no recent exacerbations and has not been using his alb inhailer as not needed         Review of Systems  Constitutional: No fever or chills  Cardiovascular: no chest pain, no palpitations and no syncope.   Respiratory: no cough, no shortness of breath during exertion and no shortness of breath at rest.   Gastrointestinal: no abdominal pain, no nausea and no vomiting.  Neuro: No Headache, no dizziness  MSK lower back pain    Objective   /71   Pulse 63   Wt 63.5 kg (140 lb)   SpO2 96%   BMI 22.00 kg/m²     Physical Exam  Constitutional: Alert and in no acute distress. Well developed, well nourished  Head and Face: Head and face: Normal.    Cardiovascular: Heart rate and rhythm were normal, normal S1 and S2. No peripheral edema.   Pulmonary: No respiratory distress. Clear bilateral breath sounds.  Musculoskeletal: Lumbar paraspinal musculature tenderness to palpation  Skin: Normal skin color and pigmentation, normal skin turgor, and no rash.    Psychiatric: Judgment and insight: Intact. Mood and affect: Normal.        Lab Results   Component Value Date    WBC 3.9 (L) 11/06/2023    HGB 12.2 (L)  11/06/2023    HCT 36.1 (L) 11/06/2023     11/06/2023    CHOL 124 05/08/2023    TRIG 53 05/08/2023    HDL 46.0 05/08/2023    ALT 30 08/10/2023    AST 29 08/10/2023     08/10/2023    K 3.7 08/10/2023     08/10/2023    CREATININE 1.34 (H) 08/10/2023    BUN 9 08/10/2023    CO2 26 08/10/2023    TSH 2.33 05/08/2023       XR shoulder right 2+ views  Narrative: Interpreted By:  YOVANY CORNELL MD  MRN: [id number]  Patient Name: LAURENCE GALLEGO     STUDY:  SHOULDER, CMPLT, MIN 2 VIEWS; Right;  8/14/2023 10:27 am     INDICATION:  shoulder pain.     COMPARISON:  None.     ACCESSION NUMBER(S):  [id number]     ORDERING CLINICIAN:  ROB AMARO     FINDINGS:  AC and glenohumeral joints are normal. No fracture dislocation or  bone lesion. Lung apex is clear.     Impression: No abnormalities.        MACRO:  None            Assessment/Plan   Problem List Items Addressed This Visit       Neutropenia (CMS/HCC)    Protein-calorie malnutrition, unspecified severity (CMS/HCC)    Stage 3a chronic kidney disease (CMS/HCC)    Relevant Orders    Follow Up In [facility] - PCP - Medicare Annual     Other Visit Diagnoses       Muscle spasm    -  Primary    Relevant Medications    methocarbamol (Robaxin) 500 mg tablet    Screen for colon cancer        Relevant Orders    Colonoscopy Screening; Average Risk Patient

## 2024-02-15 RX ORDER — TIZANIDINE 2 MG/1
2 TABLET ORAL EVERY 8 HOURS PRN
Qty: 21 TABLET | Refills: 0 | Status: SHIPPED | OUTPATIENT
Start: 2024-02-15 | End: 2024-02-22

## 2024-02-17 DIAGNOSIS — I10 BENIGN ESSENTIAL HYPERTENSION: ICD-10-CM

## 2024-02-19 RX ORDER — AMLODIPINE BESYLATE 5 MG/1
5 TABLET ORAL DAILY
Qty: 90 TABLET | Refills: 3 | OUTPATIENT
Start: 2024-02-19

## 2024-03-26 RX ORDER — SODIUM CHLORIDE, SODIUM LACTATE, POTASSIUM CHLORIDE, CALCIUM CHLORIDE 600; 310; 30; 20 MG/100ML; MG/100ML; MG/100ML; MG/100ML
20 INJECTION, SOLUTION INTRAVENOUS CONTINUOUS
Status: CANCELLED | OUTPATIENT
Start: 2024-03-26

## 2024-03-27 ENCOUNTER — HOSPITAL ENCOUNTER (OUTPATIENT)
Dept: GASTROENTEROLOGY | Facility: HOSPITAL | Age: 69
Setting detail: OUTPATIENT SURGERY
Discharge: HOME | End: 2024-03-27
Payer: COMMERCIAL

## 2024-03-27 VITALS
HEART RATE: 71 BPM | WEIGHT: 147 LBS | OXYGEN SATURATION: 97 % | SYSTOLIC BLOOD PRESSURE: 125 MMHG | TEMPERATURE: 97 F | RESPIRATION RATE: 18 BRPM | DIASTOLIC BLOOD PRESSURE: 80 MMHG | HEIGHT: 70 IN | BODY MASS INDEX: 21.05 KG/M2

## 2024-03-27 DIAGNOSIS — Z12.11 SCREEN FOR COLON CANCER: ICD-10-CM

## 2024-03-27 PROCEDURE — 2500000004 HC RX 250 GENERAL PHARMACY W/ HCPCS (ALT 636 FOR OP/ED): Performed by: STUDENT IN AN ORGANIZED HEALTH CARE EDUCATION/TRAINING PROGRAM

## 2024-03-27 PROCEDURE — 99152 MOD SED SAME PHYS/QHP 5/>YRS: CPT | Performed by: STUDENT IN AN ORGANIZED HEALTH CARE EDUCATION/TRAINING PROGRAM

## 2024-03-27 PROCEDURE — 3700000012 HC SEDATION LEVEL 5+ TIME - INITIAL 15 MINUTES 5/> YEARS

## 2024-03-27 PROCEDURE — 3700000013 HC SEDATION LEVEL 5+ TIME - EACH ADDITIONAL 15 MINUTES

## 2024-03-27 PROCEDURE — 99153 MOD SED SAME PHYS/QHP EA: CPT | Performed by: STUDENT IN AN ORGANIZED HEALTH CARE EDUCATION/TRAINING PROGRAM

## 2024-03-27 PROCEDURE — G0121 COLON CA SCRN NOT HI RSK IND: HCPCS | Performed by: STUDENT IN AN ORGANIZED HEALTH CARE EDUCATION/TRAINING PROGRAM

## 2024-03-27 PROCEDURE — 7100000009 HC PHASE TWO TIME - INITIAL BASE CHARGE

## 2024-03-27 PROCEDURE — 7100000010 HC PHASE TWO TIME - EACH INCREMENTAL 1 MINUTE

## 2024-03-27 PROCEDURE — 45378 DIAGNOSTIC COLONOSCOPY: CPT | Performed by: STUDENT IN AN ORGANIZED HEALTH CARE EDUCATION/TRAINING PROGRAM

## 2024-03-27 RX ORDER — MIDAZOLAM HYDROCHLORIDE 1 MG/ML
INJECTION, SOLUTION INTRAMUSCULAR; INTRAVENOUS AS NEEDED
Status: COMPLETED | OUTPATIENT
Start: 2024-03-27 | End: 2024-03-27

## 2024-03-27 RX ORDER — FENTANYL CITRATE 50 UG/ML
INJECTION, SOLUTION INTRAMUSCULAR; INTRAVENOUS AS NEEDED
Status: COMPLETED | OUTPATIENT
Start: 2024-03-27 | End: 2024-03-27

## 2024-03-27 RX ADMIN — FENTANYL CITRATE 25 MCG: 50 INJECTION, SOLUTION INTRAMUSCULAR; INTRAVENOUS at 15:05

## 2024-03-27 RX ADMIN — MIDAZOLAM 1 MG: 1 INJECTION INTRAMUSCULAR; INTRAVENOUS at 15:06

## 2024-03-27 RX ADMIN — MIDAZOLAM 2 MG: 1 INJECTION INTRAMUSCULAR; INTRAVENOUS at 14:51

## 2024-03-27 RX ADMIN — FENTANYL CITRATE 50 MCG: 50 INJECTION, SOLUTION INTRAMUSCULAR; INTRAVENOUS at 14:51

## 2024-03-27 ASSESSMENT — PAIN - FUNCTIONAL ASSESSMENT

## 2024-03-27 ASSESSMENT — PAIN SCALES - GENERAL
PAINLEVEL_OUTOF10: 0 - NO PAIN

## 2024-03-27 ASSESSMENT — COLUMBIA-SUICIDE SEVERITY RATING SCALE - C-SSRS
6. HAVE YOU EVER DONE ANYTHING, STARTED TO DO ANYTHING, OR PREPARED TO DO ANYTHING TO END YOUR LIFE?: NO
2. HAVE YOU ACTUALLY HAD ANY THOUGHTS OF KILLING YOURSELF?: NO
1. IN THE PAST MONTH, HAVE YOU WISHED YOU WERE DEAD OR WISHED YOU COULD GO TO SLEEP AND NOT WAKE UP?: NO

## 2024-03-27 ASSESSMENT — ENCOUNTER SYMPTOMS
GASTROINTESTINAL NEGATIVE: 1
RESPIRATORY NEGATIVE: 1
CARDIOVASCULAR NEGATIVE: 1

## 2024-03-27 NOTE — H&P
History Of Present Illness  Arturo Gmoes is a 69 y.o. male presenting with PMH CKD stage III, HLD, asthma, HTN, psoriasis, presenting for surveillance colonoscopy.     Past Medical History  Past Medical History:   Diagnosis Date    Asthma     Hypertension      Surgical History  History reviewed. No pertinent surgical history.  Social History  He reports that he has never smoked. He has never been exposed to tobacco smoke. He has never used smokeless tobacco. He reports that he does not drink alcohol and does not use drugs.    Family History  No family history on file.     Allergies  Allergies   Allergen Reactions    Peanut Shortness of breath, Swelling and Other    Other Other     Review of Systems   Respiratory: Negative.     Cardiovascular: Negative.    Gastrointestinal: Negative.      Pre-sedation Evaluation:  ASA Classification - ASA 2 - Patient with mild systemic disease with no functional limitations  Mallampati Score - II (hard and soft palate, upper portion of tonsils anduvula visible)    Physical Exam  Vitals reviewed.   Constitutional:       Appearance: Normal appearance.   HENT:      Head: Normocephalic and atraumatic.      Mouth/Throat:      Mouth: Mucous membranes are moist.      Pharynx: Oropharynx is clear.   Eyes:      Extraocular Movements: Extraocular movements intact.      Conjunctiva/sclera: Conjunctivae normal.      Pupils: Pupils are equal, round, and reactive to light.   Cardiovascular:      Rate and Rhythm: Normal rate and regular rhythm.      Pulses: Normal pulses.      Heart sounds: Normal heart sounds.   Pulmonary:      Effort: Pulmonary effort is normal. No respiratory distress.      Breath sounds: Normal breath sounds.   Abdominal:      General: Abdomen is flat. Bowel sounds are normal.      Palpations: Abdomen is soft.   Musculoskeletal:         General: No swelling. Normal range of motion.   Skin:     General: Skin is warm and dry.   Neurological:      General: No focal deficit  "present.      Mental Status: He is alert and oriented to person, place, and time.   Psychiatric:         Mood and Affect: Mood normal.         Behavior: Behavior normal.          Last Recorded Vitals  Blood pressure 135/87, pulse 60, temperature 36.1 °C (97 °F), resp. rate 18, height 1.778 m (5' 10\"), weight 66.7 kg (147 lb).     Assessment/Plan     Proceed with colonoscopy today     PTA/Current Medications:  (Not in a hospital admission)    Current Outpatient Medications   Medication Sig Dispense Refill    amLODIPine (Norvasc) 5 mg tablet Take 1 tablet (5 mg) by mouth once daily. 90 tablet 1    atorvastatin (Lipitor) 40 mg tablet Take 1 tablet (40 mg) by mouth once daily at bedtime. 90 tablet 3    loratadine (Claritin) 10 mg tablet Take 1 tablet (10 mg) by mouth once daily as needed for allergies.      albuterol (Ventolin HFA) 90 mcg/actuation inhaler Inhale 2 puffs every 6 hours if needed for shortness of breath.      albuterol 90 mcg/actuation inhaler Inhale 1 puff every 4 hours if needed.      EPINEPHrine 0.3 mg/0.3 mL injection syringe Inject 0.3 mL (0.3 mg) into the shoulder, thigh, or buttocks if needed for anaphylaxis.      tiZANidine (Zanaflex) 2 mg tablet Take 1 tablet (2 mg) by mouth every 8 hours if needed for muscle spasms for up to 7 days. 21 tablet 0    triamcinolone (Kenalog) 0.1 % ointment Apply 1 Application topically 2 times a day.       No current facility-administered medications for this encounter.     Kelly Hermosillo MD  "

## 2024-03-28 ASSESSMENT — PAIN SCALES - GENERAL: PAINLEVEL_OUTOF10: 0 - NO PAIN

## 2024-04-01 ENCOUNTER — OFFICE VISIT (OUTPATIENT)
Dept: PRIMARY CARE | Facility: CLINIC | Age: 69
End: 2024-04-01
Payer: COMMERCIAL

## 2024-04-01 VITALS
HEIGHT: 60 IN | WEIGHT: 144 LBS | SYSTOLIC BLOOD PRESSURE: 138 MMHG | BODY MASS INDEX: 28.27 KG/M2 | OXYGEN SATURATION: 95 % | HEART RATE: 68 BPM | DIASTOLIC BLOOD PRESSURE: 84 MMHG

## 2024-04-01 DIAGNOSIS — M79.672 LEFT FOOT PAIN: ICD-10-CM

## 2024-04-01 DIAGNOSIS — J45.20 MILD INTERMITTENT ASTHMA WITHOUT COMPLICATION (HHS-HCC): ICD-10-CM

## 2024-04-01 DIAGNOSIS — Z00.00 HEALTH CARE MAINTENANCE: ICD-10-CM

## 2024-04-01 DIAGNOSIS — N18.31 STAGE 3A CHRONIC KIDNEY DISEASE (MULTI): ICD-10-CM

## 2024-04-01 DIAGNOSIS — I10 BENIGN ESSENTIAL HYPERTENSION: ICD-10-CM

## 2024-04-01 DIAGNOSIS — D70.9 NEUTROPENIA, UNSPECIFIED TYPE (CMS-HCC): ICD-10-CM

## 2024-04-01 DIAGNOSIS — I10 HYPERTENSION, UNSPECIFIED TYPE: ICD-10-CM

## 2024-04-01 DIAGNOSIS — J45.20 MILD INTERMITTENT ASTHMA, UNSPECIFIED WHETHER COMPLICATED (HHS-HCC): ICD-10-CM

## 2024-04-01 DIAGNOSIS — Z00.00 ROUTINE GENERAL MEDICAL EXAMINATION AT HEALTH CARE FACILITY: Primary | ICD-10-CM

## 2024-04-01 PROBLEM — M25.511 ACUTE PAIN OF RIGHT SHOULDER: Status: RESOLVED | Noted: 2023-08-14 | Resolved: 2024-04-01

## 2024-04-01 PROCEDURE — G0439 PPPS, SUBSEQ VISIT: HCPCS | Performed by: INTERNAL MEDICINE

## 2024-04-01 PROCEDURE — 1123F ACP DISCUSS/DSCN MKR DOCD: CPT | Performed by: INTERNAL MEDICINE

## 2024-04-01 PROCEDURE — 1170F FXNL STATUS ASSESSED: CPT | Performed by: INTERNAL MEDICINE

## 2024-04-01 PROCEDURE — 3075F SYST BP GE 130 - 139MM HG: CPT | Performed by: INTERNAL MEDICINE

## 2024-04-01 PROCEDURE — 1036F TOBACCO NON-USER: CPT | Performed by: INTERNAL MEDICINE

## 2024-04-01 PROCEDURE — 3078F DIAST BP <80 MM HG: CPT | Performed by: INTERNAL MEDICINE

## 2024-04-01 PROCEDURE — 99397 PER PM REEVAL EST PAT 65+ YR: CPT | Performed by: INTERNAL MEDICINE

## 2024-04-01 PROCEDURE — 1159F MED LIST DOCD IN RCRD: CPT | Performed by: INTERNAL MEDICINE

## 2024-04-01 PROCEDURE — 1160F RVW MEDS BY RX/DR IN RCRD: CPT | Performed by: INTERNAL MEDICINE

## 2024-04-01 RX ORDER — ATORVASTATIN CALCIUM 40 MG/1
40 TABLET, FILM COATED ORAL NIGHTLY
Qty: 90 TABLET | Refills: 3 | Status: SHIPPED | OUTPATIENT
Start: 2024-04-01

## 2024-04-01 ASSESSMENT — PATIENT HEALTH QUESTIONNAIRE - PHQ9
SUM OF ALL RESPONSES TO PHQ9 QUESTIONS 1 AND 2: 0
1. LITTLE INTEREST OR PLEASURE IN DOING THINGS: NOT AT ALL
2. FEELING DOWN, DEPRESSED OR HOPELESS: NOT AT ALL

## 2024-04-01 ASSESSMENT — ACTIVITIES OF DAILY LIVING (ADL)
DOING_HOUSEWORK: INDEPENDENT
BATHING: INDEPENDENT
MANAGING_FINANCES: INDEPENDENT
DRESSING: INDEPENDENT
TAKING_MEDICATION: INDEPENDENT
GROCERY_SHOPPING: INDEPENDENT

## 2024-04-01 NOTE — PROGRESS NOTES
Subjective   Patient ID: Arturo Gomes is a 69 y.o. male who presents for Medicare Annual Wellness Visit Subsequent.    Past Medical, Surgical, and Family History reviewed and updated in chart.    Reviewed all medications by prescribing practitioner or clinical pharmacist (such as prescriptions, OTCs, herbal therapies and supplements) and documented in the medical record.        Patient Care Team:  Larry Ansari DO as PCP - General (Internal Medicine)  Mark Urias MD as Consulting Physician (Hematology and Oncology)     HPI     Patient is a 69-year-old male with past medical history of stage III chronic kidney disease hypertension asthma psoriasis who presents for wellness.  Patient plans on going back to work and needs forms filled out.  No record of MMR.  Needs titers drawn.  No complaints at this time.  Takes his medications as prescribed.  Blood pressure slightly elevated today.  No headache change in vision orthopnea.  No complaints.  Lives at home with his wife.  Able to do all of his activities of daily living.  No recent falls or injury.  Denies illicit substances smoking or alcohol.  Follows with hematology due to elevated kappa lambda and was advised to follow-up as needed.  No complaints today    Review of Systems  Constitutional: No fever or chills, No Night Sweats  Eyes: No Blurry Vision or Eye sight problems  ENT: No Nasal Discharge, Hoarseness, sore throat  Cardiovascular: no chest pain, no palpitations and no syncope.   Respiratory: no cough, no shortness of breath during exertion and no shortness of breath at rest.   Gastrointestinal: no abdominal pain, no nausea and no vomiting.   : No issues with urinary stream, burning with urination, no blood in urine or stools  Skin: No Skin rashes or Lesions  Neuro: No Headache, no dizziness or Numbness or tingling  Psych: No Anxiety, depression or sleeping problems  Heme: No Easy bleeding or brusing.     Objective   /84   Pulse 68   Ht 1.473  "m (4' 10\")   Wt 65.3 kg (144 lb)   SpO2 95%   BMI 30.10 kg/m²     Physical Exam  Constitutional: Alert and in no acute distress. Well developed, well nourished.   Head and Face: Head and face: Normal.    Eyes: Normal external exam. Pupils were equal in size, round, reactive to light (PERRL) with normal accommodation and extraocular movements intact (EOMI).   Ears, Nose, Mouth, and Throat: External inspection of ears and nose: Normal.  Hearing: Normal.  Nasal mucosa, septum, and turbinates: Normal.  Lips, teeth, and gums: Normal.  Oropharynx: Normal.   Neck: No neck mass was observed. Supple. Thyroid not enlarged and there were no palpable thyroid nodules.   Cardiovascular: Heart rate and rhythm were normal, normal S1 and S2. Pedal pulses: Normal. No peripheral edema.   Pulmonary: No respiratory distress. Clear bilateral breath sounds.   Abdomen: Soft nontender; no abdominal mass palpated. Normal bowel sounds. No organomegaly.   : Deferred  Musculoskeletal: No joint swelling seen, normal movements of all extremities. Range of motion: Normal.  Muscle strength/tone: Normal.    Skin: Normal skin color and pigmentation, normal skin turgor, and no rash.   Neurologic: Deep tendon reflexes were 2+ and symmetric.   Psychiatric: Judgment and insight: Intact. Mood and affect: Normal.  Lymphatic: No cervical lymphadenopathy. Palpation of lymph nodes in axillae: Normal.  Palpation of lymph nodes in groin: Normal.    Lab Results   Component Value Date    WBC 3.9 (L) 11/06/2023    HGB 12.2 (L) 11/06/2023    HCT 36.1 (L) 11/06/2023     11/06/2023    CHOL 124 05/08/2023    TRIG 53 05/08/2023    HDL 46.0 05/08/2023    ALT 30 08/10/2023    AST 29 08/10/2023     08/10/2023    K 3.7 08/10/2023     08/10/2023    CREATININE 1.34 (H) 08/10/2023    BUN 9 08/10/2023    CO2 26 08/10/2023    TSH 2.33 05/08/2023       Colonoscopy Screening; Average Risk Patient  Table formatting from the original result was not " included.  Impression  The perianal exam was normal.  The terminal ileum appeared normal.  The entire colon was normal on direct and retroflexion views.    Findings  The perianal exam was normal.  The terminal ileum appeared normal.  The entire colon was normal on direct and retroflexion views.    Recommendation   Follow up with PCP    Repeat screening colonoscopy in 10 years      - Patient has a contact number available for  emergencies. The signs and   symptoms of potential delayed complications were discussed with the   patient. Return to normal activities tomorrow. Written discharge   instructions were provided to the patient.  - Resume previous diet.  - Continue present medications.  - Return to primary care physician as previously scheduled.   - The findings and recommendations were discussed with the patient.     Indication  Screen for colon cancer  Last colonoscopy 2022, two years ago, normal    Staff  Staff Role   Shanita Guadalupe MD Proceduralist   Kelly Hermosillo MD Fellow     Medications  fentaNYL PF (Sublimaze) injection 75 mcg   midazolam (Versed) injection 3 mg   (Totals for administrations occurring from 1443 to 1545 on 03/27/24)     Preprocedure  A history and physical has been performed, and patient medication   allergies have been reviewed. The patient's tolerance of previous   anesthesia has been reviewed. The risks and benefits of the procedure and   the sedation options and risks were discussed with the patient. All   questions were answered and informed consent obtained.    Details of the Procedure  The patient underwent moderate sedation, which was administered by the   procedural nurse. The patient's blood pressure, ECG, ETCO2, heart rate,   level of consciousness, oxygen and respirations were monitored throughout   the procedure. A digital rectal exam was performed. A perianal exam was   performed. The scope was introduced through the anus and advanced to the   terminal ileum.  Retroflexion was performed in the rectum. The quality of   bowel preparation was evaluated using the [place] Bowel Preparation Scale   with scores of: right colon = 2, transverse colon = 3, left colon = 3. The   total BBPS score was 8. Bowel prep was adequate. The patient experienced   no blood loss. The procedure was moderately difficult due to loops in the   digestive tract. In response to procedure difficulty, counter pressure was   applied and the instrument was changed to a pediatric endoscope. The   patient tolerated the procedure well. There were no apparent adverse   events.     Events  Procedure Events   Event Event Time   ENDO SCOPE IN TIME 3/27/2024  2:53 PM   ENDO CECUM REACHED 3/27/2024  3:27 PM   ENDO SCOPE OUT TIME 3/27/2024  3:44 PM     Specimens  No specimens collected    Procedure Location  [facility]  [address] [address] [address] [facility] [address]  [phone]    Referring Provider  Larry Ansari, DO  [address] [facility] [address]  [place] [address]    Procedure Provider  Shanita Guadalupe MD      Assessment/Plan   Problem List Items Addressed This Visit             ICD-10-CM    Neutropenia (CMS/HCC) D70.9     Following with hematology.  Check CBC         Stage 3a chronic kidney disease (CMS/HCC) N18.31     Drink at least 1.5 L of fluid daily.  Avoid ibuprofen.  Check renal function as well as urine albumin         Relevant Medications    atorvastatin (Lipitor) 40 mg tablet    Other Relevant Orders    Albumin , Urine Random    Asthma J45.909     Continue albuterol as needed.  No recent flares         Benign essential hypertension I10     Continue current medications.  Advised ambulatory blood pressure monitoring.  Recheck 6 months.         Relevant Medications    atorvastatin (Lipitor) 40 mg tablet    Mild intermittent asthma without complication J45.20     Other Visit Diagnoses         Codes    Routine general medical examination at health care facility     -  Primary Z00.00    Health care maintenance     Z00.00    Relevant Orders    Measles (Rubeola) Antibody, IgG    Mumps Antibody, IgG    Rubella antibody, IgG    T-Spot TB    Renal function panel    CBC    Left foot pain     M79.672    Relevant Orders    Referral to Podiatry    Hypertension, unspecified type     I10    Relevant Orders    CBC              Dear Arturo Gomes     It was my pleasure to take care of you today in the office. Below are the things we discussed today:    1. Immunizations: Yearly Flu shot is recommended.          a: COVID: Up-to-date         b: Tetanus: Up-to-date         c: Shingrix: Up-to-date         d: Pneumovax: Up-to-date         e: Prevnar: Up-to-date    2. Blood Work: Ordered  3. Seen your dentist twice a year  4. Yearly Eye exam is recommended    5. BMI: 30.1  6: Diet recommendations:   Eat Clean, Try to have as many home cooked meals as possible  Avoid processed foods which contain excess calories, sugar, and sodium.    7. Exercise recommendations:   150 minutes a week to maintain your weight     If you have to loose weight, you need a better diet and exercise plan.     8. Please get your Living will / Advance directive completed if you do not have one already. Please make sure our office has a copy of the latest one.     9. Colonoscopy: Uptodate  10. PSA: Today    11.     Follow up in one year for a Physical. Please call the office before your Physical to see if you need blood work completed prior to your physical.     Please call me if any questions arise from now until your next visit. I will call you after I am done seeing patients. A Doctor is always available by phone when the office is closed. Please feel free to call for help with any problem that you feel shouldn't wait until the office re-opens.     Larry Ansari, DO

## 2024-04-01 NOTE — ASSESSMENT & PLAN NOTE
Drink at least 1.5 L of fluid daily.  Avoid ibuprofen.  Check renal function as well as urine albumin

## 2024-04-03 DIAGNOSIS — I10 BENIGN ESSENTIAL HYPERTENSION: ICD-10-CM

## 2024-04-04 RX ORDER — AMLODIPINE BESYLATE 5 MG/1
5 TABLET ORAL DAILY
Qty: 90 TABLET | Refills: 1 | Status: SHIPPED | OUTPATIENT
Start: 2024-04-04

## 2024-05-14 ENCOUNTER — APPOINTMENT (OUTPATIENT)
Dept: PRIMARY CARE | Facility: CLINIC | Age: 69
End: 2024-05-14
Payer: COMMERCIAL

## 2024-06-24 ENCOUNTER — OFFICE VISIT (OUTPATIENT)
Dept: PODIATRY | Facility: HOSPITAL | Age: 69
End: 2024-06-24
Payer: COMMERCIAL

## 2024-06-24 VITALS
WEIGHT: 137.8 LBS | HEART RATE: 57 BPM | HEIGHT: 70 IN | OXYGEN SATURATION: 97 % | DIASTOLIC BLOOD PRESSURE: 83 MMHG | BODY MASS INDEX: 19.73 KG/M2 | SYSTOLIC BLOOD PRESSURE: 135 MMHG

## 2024-06-24 DIAGNOSIS — M77.42 METATARSALGIA OF BOTH FEET: Primary | ICD-10-CM

## 2024-06-24 DIAGNOSIS — M79.672 LEFT FOOT PAIN: ICD-10-CM

## 2024-06-24 DIAGNOSIS — L84 CORNS AND CALLOSITIES: ICD-10-CM

## 2024-06-24 DIAGNOSIS — M21.6X2 OTHER ACQUIRED DEFORMITIES OF LEFT FOOT: ICD-10-CM

## 2024-06-24 DIAGNOSIS — R60.0 LOCALIZED EDEMA: ICD-10-CM

## 2024-06-24 DIAGNOSIS — M77.41 METATARSALGIA OF BOTH FEET: Primary | ICD-10-CM

## 2024-06-24 DIAGNOSIS — M62.838 MUSCLE SPASM OF LEFT LOWER EXTREMITY: ICD-10-CM

## 2024-06-24 DIAGNOSIS — M21.6X1 OTHER ACQUIRED DEFORMITIES OF RIGHT FOOT: ICD-10-CM

## 2024-06-24 PROBLEM — D64.9 ANEMIA: Status: ACTIVE | Noted: 2023-11-06

## 2024-06-24 PROBLEM — R51.9 HEADACHE: Status: ACTIVE | Noted: 2024-06-24

## 2024-06-24 PROBLEM — S43.006A DISLOCATION OF JOINT OF SHOULDER: Status: ACTIVE | Noted: 2024-06-24

## 2024-06-24 PROBLEM — S90.456A: Status: ACTIVE | Noted: 2024-06-24

## 2024-06-24 PROBLEM — L08.9: Status: ACTIVE | Noted: 2024-06-24

## 2024-06-24 PROCEDURE — 99214 OFFICE O/P EST MOD 30 MIN: CPT | Performed by: STUDENT IN AN ORGANIZED HEALTH CARE EDUCATION/TRAINING PROGRAM

## 2024-06-24 RX ORDER — SUMATRIPTAN SUCCINATE 25 MG/1
25 TABLET ORAL ONCE AS NEEDED
COMMUNITY
Start: 2019-08-06

## 2024-06-24 RX ORDER — TOPIRAMATE 50 MG/1
50 TABLET, FILM COATED ORAL DAILY
COMMUNITY
Start: 2019-08-19

## 2024-06-24 ASSESSMENT — PAIN SCALES - GENERAL: PAINLEVEL: 0-NO PAIN

## 2024-06-24 NOTE — PROGRESS NOTES
"Subjective   Chief Complaint: New Patient Visit (Swollen at the bottom of feet.)    HPI:   Pt presents today for pain to the left foot. States that he was out of work for a few years. But recently went back to work. He states that he used to wear walking shoes and compression stocking when working but has since gave them away when he was not working. 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 He states that he was having some muscle spasms a few weeks ago but they have since gone away. States the pain is achy in nature. Localized to the balls of the foot L a little worse than the R.  Patient denies any trauma to the area. Denies any constitutional symptoms at this time. No other pedal complaints.      Objective   Blood pressure 135/83, pulse 57, height 1.778 m (5' 10\"), weight 62.5 kg (137 lb 12.8 oz), SpO2 97%.    Pt is AAOx3.   Pt is ambulating in regular shoe gear. No assistive device.    Vascular:   DP pulses palpable bilateral.   PT pulses palpable bilateral.  Skin temperature is warm to warm from proximal tibial tuberosity to distal digits bilateral.  Capillary refill time is <3 seconds to digits bilateral.  Mild nonpitting edema noted bilateral.  Digital hair growth is present.    Neurologic:   Light touch sensation intact bilateral.  Protective sensation intact bilateral.  Pt denies any numbness, burning or tingling.    Dermatologic:   No lesions or rashes.   Ecchymosis is absent.  Toe nails 1-5 bilateral are thickened and elongated with subungual debris.  Webspaces 1-4 bilateral are clean and without maceration.   No open wounds.  Some hyperkeratosis to the sub 2-4 metatarsal head bilateral.    Musculoskeletal:   5/5 muscle strength to all pedal muscle groups bilateral.  ROM to Ankle Joint full wo pain bilateral.  ROM to STJ full wo pain bilateral.  ROM to 1st MPJ full wo pain bilateral.  No pain to the feet.  Mild decrease in medial " longitudinal arch.     Imaging:  XR: Reviewed imaging from  2/15/2023.       Assessment   Problem List Items Addressed This Visit    None  Visit Diagnoses         Codes    Metatarsalgia of both feet    -  Primary M77.41, M77.42    Left foot pain     M79.672    Muscle spasm of left lower extremity     M62.838    Corns and callosities     L84    Other acquired deformities of left foot     M21.6X2    Other acquired deformities of right foot     M21.6X1    Localized edema     R60.0             Plan   - The patient was seen and evaluated; all findings were discussed as well as the etiology of diagnosis. All questions ere answered the the patient's satisfaction.  - Charts, labs, vitals, and imaging were all reviewed.    - Recommended Powerstep inserts to wear in shoe gear to aid in arch support. Provided information for these.  - Recommended supportive shoe gear; gave list of recommendations.   - Recommended use of compression stockings while working to aid in swelling control.  - Recommended continue use of topical pain agent as needed or tylenol as needed.  - Pt ok to use pumice stone to the balls of the feet after showering as needed for callous tissue.    - Pt is to follow up in [facility] at [facility].    A total of 40 minutes were spent coordinating care for this patient. This time was spent doing a combination of tasks such as reviewing records including imaging, labs, previous medical records as well as discussing the patient's diagnoses and different treatment options. Time was also spent charting for this patient.

## 2024-09-15 ENCOUNTER — CLINICAL SUPPORT (OUTPATIENT)
Dept: EMERGENCY MEDICINE | Facility: HOSPITAL | Age: 69
End: 2024-09-15
Payer: COMMERCIAL

## 2024-09-15 ENCOUNTER — APPOINTMENT (OUTPATIENT)
Dept: RADIOLOGY | Facility: HOSPITAL | Age: 69
End: 2024-09-15
Payer: COMMERCIAL

## 2024-09-15 ENCOUNTER — HOSPITAL ENCOUNTER (EMERGENCY)
Facility: HOSPITAL | Age: 69
Discharge: HOME | End: 2024-09-16
Attending: EMERGENCY MEDICINE
Payer: COMMERCIAL

## 2024-09-15 DIAGNOSIS — R55 PRE-SYNCOPE: Primary | ICD-10-CM

## 2024-09-15 LAB
ALBUMIN SERPL BCP-MCNC: 3.7 G/DL (ref 3.4–5)
ALP SERPL-CCNC: 65 U/L (ref 33–136)
ALT SERPL W P-5'-P-CCNC: 23 U/L (ref 10–52)
ANION GAP BLDV CALCULATED.4IONS-SCNC: 7 MMOL/L (ref 10–25)
ANION GAP SERPL CALC-SCNC: 12 MMOL/L (ref 10–20)
AST SERPL W P-5'-P-CCNC: 36 U/L (ref 9–39)
ATRIAL RATE: 67 BPM
BASE EXCESS BLDV CALC-SCNC: -0.2 MMOL/L (ref -2–3)
BASOPHILS # BLD AUTO: 0.04 X10*3/UL (ref 0–0.1)
BASOPHILS NFR BLD AUTO: 0.7 %
BILIRUB SERPL-MCNC: 1.8 MG/DL (ref 0–1.2)
BNP SERPL-MCNC: 19 PG/ML (ref 0–99)
BODY TEMPERATURE: 37 DEGREES CELSIUS
BUN SERPL-MCNC: 15 MG/DL (ref 6–23)
CA-I BLDV-SCNC: 1.15 MMOL/L (ref 1.1–1.33)
CALCIUM SERPL-MCNC: 8.5 MG/DL (ref 8.6–10.6)
CARDIAC TROPONIN I PNL SERPL HS: 6 NG/L (ref 0–53)
CARDIAC TROPONIN I PNL SERPL HS: 7 NG/L (ref 0–53)
CHLORIDE BLDV-SCNC: 110 MMOL/L (ref 98–107)
CHLORIDE SERPL-SCNC: 108 MMOL/L (ref 98–107)
CK SERPL-CCNC: 698 U/L (ref 0–325)
CO2 SERPL-SCNC: 25 MMOL/L (ref 21–32)
CREAT SERPL-MCNC: 1.31 MG/DL (ref 0.5–1.3)
EGFRCR SERPLBLD CKD-EPI 2021: 59 ML/MIN/1.73M*2
EOSINOPHIL # BLD AUTO: 0.1 X10*3/UL (ref 0–0.7)
EOSINOPHIL NFR BLD AUTO: 1.8 %
ERYTHROCYTE [DISTWIDTH] IN BLOOD BY AUTOMATED COUNT: 11.5 % (ref 11.5–14.5)
GLUCOSE BLDV-MCNC: 93 MG/DL (ref 74–99)
GLUCOSE SERPL-MCNC: 91 MG/DL (ref 74–99)
HCO3 BLDV-SCNC: 25.9 MMOL/L (ref 22–26)
HCT VFR BLD AUTO: 30 % (ref 41–52)
HCT VFR BLD EST: 32 % (ref 41–52)
HGB BLD-MCNC: 10.3 G/DL (ref 13.5–17.5)
HGB BLDV-MCNC: 10.7 G/DL (ref 13.5–17.5)
IMM GRANULOCYTES # BLD AUTO: 0.02 X10*3/UL (ref 0–0.7)
IMM GRANULOCYTES NFR BLD AUTO: 0.4 % (ref 0–0.9)
LACTATE BLDV-SCNC: 0.7 MMOL/L (ref 0.4–2)
LYMPHOCYTES # BLD AUTO: 0.85 X10*3/UL (ref 1.2–4.8)
LYMPHOCYTES NFR BLD AUTO: 15 %
MAGNESIUM SERPL-MCNC: 2.11 MG/DL (ref 1.6–2.4)
MCH RBC QN AUTO: 30.6 PG (ref 26–34)
MCHC RBC AUTO-ENTMCNC: 34.3 G/DL (ref 32–36)
MCV RBC AUTO: 89 FL (ref 80–100)
MONOCYTES # BLD AUTO: 0.48 X10*3/UL (ref 0.1–1)
MONOCYTES NFR BLD AUTO: 8.5 %
NEUTROPHILS # BLD AUTO: 4.19 X10*3/UL (ref 1.2–7.7)
NEUTROPHILS NFR BLD AUTO: 73.6 %
NRBC BLD-RTO: 0 /100 WBCS (ref 0–0)
OXYHGB MFR BLDV: 24.3 % (ref 45–75)
P AXIS: 77 DEGREES
P OFFSET: 222 MS
P ONSET: 155 MS
PCO2 BLDV: 48 MM HG (ref 41–51)
PH BLDV: 7.34 PH (ref 7.33–7.43)
PLATELET # BLD AUTO: 135 X10*3/UL (ref 150–450)
PO2 BLDV: 30 MM HG (ref 35–45)
POTASSIUM BLDV-SCNC: 5.2 MMOL/L (ref 3.5–5.3)
POTASSIUM SERPL-SCNC: 4.9 MMOL/L (ref 3.5–5.3)
PR INTERVAL: 144 MS
PROT SERPL-MCNC: 6.8 G/DL (ref 6.4–8.2)
Q ONSET: 227 MS
QRS COUNT: 11 BEATS
QRS DURATION: 80 MS
QT INTERVAL: 390 MS
QTC CALCULATION(BAZETT): 412 MS
QTC FREDERICIA: 404 MS
R AXIS: 25 DEGREES
RBC # BLD AUTO: 3.37 X10*6/UL (ref 4.5–5.9)
SAO2 % BLDV: 25 % (ref 45–75)
SODIUM BLDV-SCNC: 138 MMOL/L (ref 136–145)
SODIUM SERPL-SCNC: 140 MMOL/L (ref 136–145)
T AXIS: 6 DEGREES
T OFFSET: 422 MS
VENTRICULAR RATE: 67 BPM
WBC # BLD AUTO: 5.7 X10*3/UL (ref 4.4–11.3)

## 2024-09-15 PROCEDURE — 82550 ASSAY OF CK (CPK): CPT

## 2024-09-15 PROCEDURE — 84132 ASSAY OF SERUM POTASSIUM: CPT | Performed by: EMERGENCY MEDICINE

## 2024-09-15 PROCEDURE — 2500000001 HC RX 250 WO HCPCS SELF ADMINISTERED DRUGS (ALT 637 FOR MEDICARE OP): Performed by: EMERGENCY MEDICINE

## 2024-09-15 PROCEDURE — 96361 HYDRATE IV INFUSION ADD-ON: CPT

## 2024-09-15 PROCEDURE — 84484 ASSAY OF TROPONIN QUANT: CPT

## 2024-09-15 PROCEDURE — 2500000004 HC RX 250 GENERAL PHARMACY W/ HCPCS (ALT 636 FOR OP/ED): Performed by: EMERGENCY MEDICINE

## 2024-09-15 PROCEDURE — 70450 CT HEAD/BRAIN W/O DYE: CPT

## 2024-09-15 PROCEDURE — 84484 ASSAY OF TROPONIN QUANT: CPT | Performed by: EMERGENCY MEDICINE

## 2024-09-15 PROCEDURE — 71046 X-RAY EXAM CHEST 2 VIEWS: CPT

## 2024-09-15 PROCEDURE — 93005 ELECTROCARDIOGRAM TRACING: CPT

## 2024-09-15 PROCEDURE — 2500000004 HC RX 250 GENERAL PHARMACY W/ HCPCS (ALT 636 FOR OP/ED)

## 2024-09-15 PROCEDURE — 82330 ASSAY OF CALCIUM: CPT

## 2024-09-15 PROCEDURE — 36415 COLL VENOUS BLD VENIPUNCTURE: CPT | Performed by: EMERGENCY MEDICINE

## 2024-09-15 PROCEDURE — 70450 CT HEAD/BRAIN W/O DYE: CPT | Performed by: RADIOLOGY

## 2024-09-15 PROCEDURE — 99285 EMERGENCY DEPT VISIT HI MDM: CPT

## 2024-09-15 PROCEDURE — 90715 TDAP VACCINE 7 YRS/> IM: CPT | Performed by: EMERGENCY MEDICINE

## 2024-09-15 PROCEDURE — 84132 ASSAY OF SERUM POTASSIUM: CPT

## 2024-09-15 PROCEDURE — 83880 ASSAY OF NATRIURETIC PEPTIDE: CPT

## 2024-09-15 PROCEDURE — 90471 IMMUNIZATION ADMIN: CPT | Performed by: EMERGENCY MEDICINE

## 2024-09-15 PROCEDURE — 96360 HYDRATION IV INFUSION INIT: CPT

## 2024-09-15 PROCEDURE — 71046 X-RAY EXAM CHEST 2 VIEWS: CPT | Performed by: RADIOLOGY

## 2024-09-15 PROCEDURE — 85025 COMPLETE CBC W/AUTO DIFF WBC: CPT | Performed by: EMERGENCY MEDICINE

## 2024-09-15 PROCEDURE — 83735 ASSAY OF MAGNESIUM: CPT

## 2024-09-15 RX ORDER — BACITRACIN ZINC 500 UNIT/G
OINTMENT IN PACKET (EA) TOPICAL ONCE
Status: COMPLETED | OUTPATIENT
Start: 2024-09-15 | End: 2024-09-15

## 2024-09-15 ASSESSMENT — LIFESTYLE VARIABLES
EVER HAD A DRINK FIRST THING IN THE MORNING TO STEADY YOUR NERVES TO GET RID OF A HANGOVER: NO
TOTAL SCORE: 0
HAVE PEOPLE ANNOYED YOU BY CRITICIZING YOUR DRINKING: NO
EVER FELT BAD OR GUILTY ABOUT YOUR DRINKING: NO
HAVE YOU EVER FELT YOU SHOULD CUT DOWN ON YOUR DRINKING: NO

## 2024-09-15 ASSESSMENT — COLUMBIA-SUICIDE SEVERITY RATING SCALE - C-SSRS
2. HAVE YOU ACTUALLY HAD ANY THOUGHTS OF KILLING YOURSELF?: NO
6. HAVE YOU EVER DONE ANYTHING, STARTED TO DO ANYTHING, OR PREPARED TO DO ANYTHING TO END YOUR LIFE?: NO
1. IN THE PAST MONTH, HAVE YOU WISHED YOU WERE DEAD OR WISHED YOU COULD GO TO SLEEP AND NOT WAKE UP?: NO

## 2024-09-15 ASSESSMENT — PAIN SCALES - GENERAL: PAINLEVEL_OUTOF10: 0 - NO PAIN

## 2024-09-15 ASSESSMENT — PAIN - FUNCTIONAL ASSESSMENT: PAIN_FUNCTIONAL_ASSESSMENT: 0-10

## 2024-09-15 NOTE — ED TRIAGE NOTES
"Pt presents to the ed via [employer] .  [facility] received a call for a male with a head injury.  Pt sts he was at work when he stood up and struck his head on a piece of machinery. Pt has a small \"v\" shaped laceration to L forehead.  Pt was released without transport to hospital.  Pt immediately left work and walked 2.5 hours home.  About half way home pt suddenly began feeling weak.  Pt denies CP, SOB, nausea, vomiting, dizziness  "

## 2024-09-15 NOTE — ED PROVIDER NOTES
"Emergency Department Provider Note          History of Present Illness     CC: Lethargy     History provided by: Patient and Family Member  Limitations to History: None    HPI:   Arturo Gomes is a 69 y.o.male with PMH CKD stage III, HLD, asthma, HTN, psoriasis, presenting to the Emergency Department for presyncope.  Patient was in his kitchen earlier today when he stood up too fast and hit the left side of his head on a cabinet.  Has a 1 cm laceration that has self tamponaded on its own.  Was feeling generally well before and after this until he walked to [employer] and upon being outside for greater than 30 minutes, suddenly felt lightheaded and had weakness of the bilateral lower extremities.  Reports his legs \"felt heavy\". Upon arriving at home he tripped up the stairs and he said his legs are feeling heavier than usual.  Sat down and had his symptoms resolve over the past couple minutes.  This entire episode lasted about an hour and a half.  Has never had symptoms like this before.  On arrival to the emergency department has no acute complaints for us today.  Denies chest pain, shortness of breath, abdominal pain, or change in urination/stool today.    Records Reviewed: Recent available ED and inpatient notes reviewed in EMR.    PMHx/PSHx:  Per HPI.   - has a past medical history of Asthma ([facility]) and Hypertension.  - has no past surgical history on file.  - has Neutropenia ([facility]); Protein-calorie malnutrition, unspecified severity (Multi); Stage 3a chronic kidney disease (Multi); Asthma ([facility]); Benign essential hypertension; Dyslipidemia; Mild intermittent asthma without complication ([facility]); Psoriasis; Adrenal adenoma; Anemia; Dislocation of joint of shoulder; Foreign body in skin of toe with infection; and Headache on their problem list.    Medications:  Current Outpatient Medications   Medication Instructions    albuterol (Ventolin HFA) 90 mcg/actuation inhaler 2 puffs, inhalation, Every 6 hours " PRN    amLODIPine (NORVASC) 5 mg, oral, Daily    atorvastatin (LIPITOR) 40 mg, oral, Nightly    EPINEPHrine (EPIPEN) 0.3 mg, intramuscular, As needed    loratadine (CLARITIN) 10 mg, oral, Daily PRN    SUMAtriptan (IMITREX) 25 mg, oral, Once as needed, MAY TAKE SECOND DOSE IN 2 HOURS IF NEEDED; NOT TO EXCEED 200MG IN 24 HOURS    tiZANidine (ZANAFLEX) 2 mg, oral, Every 8 hours PRN    topiramate (TOPAMAX) 50 mg, oral, Daily    triamcinolone (Kenalog) 0.1 % ointment 1 Application, Topical, 2 times daily        Allergies:  Peanut    Social History:  - Tobacco:  reports that he has never smoked. He has never been exposed to tobacco smoke. He has never used smokeless tobacco.   - Alcohol:  reports no history of alcohol use.   - Illicit Drugs:  reports no history of drug use.     ROS:  Per HPI.       Physical Exam     Triage Vitals:  T 37.2 °C (98.9 °F)  HR 65  BP (!) 166/93  RR 16  O2 98 % None (Room air)    General: Awake, alert, in no acute distress  Eyes: Gaze conjugate.  No scleral icterus or injection  HENT: Normo-cephalic, atraumatic. No stridor  CV: Regular rate, regular rhythm. Radial pulses 2+ bilaterally  Resp: Breathing non-labored, speaking in full sentences.  Clear to auscultation bilaterally  GI: Soft, non-distended, non-tender. No rebound or guarding.  MSK/Extremities: No gross bony deformities. Moving all extremities  Skin: Warm. Appropriate color  Neuro: Alert. Oriented. Face symmetric. Speech is fluent.  Gross strength and sensation intact in b/l UE and LEs  Psych: Appropriate mood and affect          Siva Coma Scale Score: 15                    Medical Decision Making & ED Course     EKG: EKG interpreted by myself. Please see ED Course for full interpretation.    Medical Decision Making   Arturo Gomes is a 69 y.o.male presenting to the Emergency Department for presyncope.  On arrival, vital signs within normal limits, afebrile for us.  On examination, patient is otherwise clinically well.  On  my examination has 5/5 strength in the upper and lower extremities with sensation intact.  Lower concern for acute stroke like process today.  Labs are performed in the emergency department showed a BNP of 19, troponin at 7, and EKG within normal limits.  Lower concern for cardiac cause of his symptoms today.  CT of the head negative for acute cranial process.  X-ray chest showed no acute cardiopulmonary process.  Chemistry showed a creatinine 1.31 consistent with patient's known CKD.  Lactate 0.7.  CBC notable for white count of 5.7, hemoglobin stable at 10.3 consistent with normocytic anemia.  Creatinine kinase mildly elevated at 698.  Think the likely cause of some patient symptoms today is overexertion versus heat exhaustion versus vasovagal syncope. Given fluids in the emergency department and on reassessment patient feels symptomatically improved.  Able to ambulate for us and tolerate p.o. without issue.  Lower concern for cardiogenic or neurologic cause of patient's symptoms today. Will be discharged home with close PCP follow-up and strict return precautions.      ED Course as of 09/16/24 0457   Sun Sep 15, 2024   1952 RED CELL DISTRIBUTION WIDTH: 11.5 [AS]   Mon Sep 16, 2024   0456 EKG regular rate, regular rhythm.  MN, QRS, QTc intervals within normal limits.  No ST elevations, depressions, T wave inversions.  Normal sinus rhythm. [AS]      ED Course User Index  [AS] Mark Gilmore MD         Diagnoses as of 09/16/24 0457   Pre-syncope       Independent Result Review and Interpretation: Relevant laboratory and radiographic results were reviewed and independently interpreted by myself.  As necessary, they are commented on in the ED Course.    Chronic conditions affecting the patient's care: As documented above in MDM.    Disposition   Discharge    Mark Gilmore MD  Emergency Medicine PGY3      Procedures     Procedures ? Smart[employer] last updated 9/16/2024 4:57 AM        Mark Gilmore  MD  Resident  09/16/24 0450

## 2024-09-16 ENCOUNTER — HOSPITAL ENCOUNTER (EMERGENCY)
Facility: HOSPITAL | Age: 69
Discharge: LEFT BEFORE DISCHARGE | End: 2024-09-16
Payer: COMMERCIAL

## 2024-09-16 VITALS
OXYGEN SATURATION: 98 % | DIASTOLIC BLOOD PRESSURE: 70 MMHG | SYSTOLIC BLOOD PRESSURE: 113 MMHG | HEART RATE: 73 BPM | BODY MASS INDEX: 21.05 KG/M2 | RESPIRATION RATE: 16 BRPM | TEMPERATURE: 98.5 F | WEIGHT: 147 LBS | HEIGHT: 70 IN

## 2024-09-16 VITALS
TEMPERATURE: 98.4 F | WEIGHT: 147 LBS | RESPIRATION RATE: 18 BRPM | HEART RATE: 58 BPM | DIASTOLIC BLOOD PRESSURE: 84 MMHG | OXYGEN SATURATION: 100 % | HEIGHT: 71 IN | SYSTOLIC BLOOD PRESSURE: 142 MMHG | BODY MASS INDEX: 20.58 KG/M2

## 2024-09-16 LAB
ALBUMIN SERPL BCP-MCNC: 4.1 G/DL (ref 3.4–5)
ALP SERPL-CCNC: 76 U/L (ref 33–136)
ALT SERPL W P-5'-P-CCNC: 21 U/L (ref 10–52)
ANION GAP SERPL CALC-SCNC: 12 MMOL/L (ref 10–20)
AST SERPL W P-5'-P-CCNC: 28 U/L (ref 9–39)
BASOPHILS # BLD AUTO: 0.04 X10*3/UL (ref 0–0.1)
BASOPHILS NFR BLD AUTO: 0.7 %
BILIRUB SERPL-MCNC: 2.3 MG/DL (ref 0–1.2)
BUN SERPL-MCNC: 13 MG/DL (ref 6–23)
CALCIUM SERPL-MCNC: 9.2 MG/DL (ref 8.6–10.6)
CHLORIDE SERPL-SCNC: 106 MMOL/L (ref 98–107)
CO2 SERPL-SCNC: 25 MMOL/L (ref 21–32)
CREAT SERPL-MCNC: 1.28 MG/DL (ref 0.5–1.3)
EGFRCR SERPLBLD CKD-EPI 2021: 61 ML/MIN/1.73M*2
EOSINOPHIL # BLD AUTO: 0.07 X10*3/UL (ref 0–0.7)
EOSINOPHIL NFR BLD AUTO: 1.2 %
ERYTHROCYTE [DISTWIDTH] IN BLOOD BY AUTOMATED COUNT: 11.4 % (ref 11.5–14.5)
GLUCOSE SERPL-MCNC: 107 MG/DL (ref 74–99)
HCT VFR BLD AUTO: 36.2 % (ref 41–52)
HGB BLD-MCNC: 11.5 G/DL (ref 13.5–17.5)
IMM GRANULOCYTES # BLD AUTO: 0.01 X10*3/UL (ref 0–0.7)
IMM GRANULOCYTES NFR BLD AUTO: 0.2 % (ref 0–0.9)
LYMPHOCYTES # BLD AUTO: 0.75 X10*3/UL (ref 1.2–4.8)
LYMPHOCYTES NFR BLD AUTO: 12.8 %
MCH RBC QN AUTO: 29.3 PG (ref 26–34)
MCHC RBC AUTO-ENTMCNC: 31.8 G/DL (ref 32–36)
MCV RBC AUTO: 92 FL (ref 80–100)
MONOCYTES # BLD AUTO: 0.37 X10*3/UL (ref 0.1–1)
MONOCYTES NFR BLD AUTO: 6.3 %
NEUTROPHILS # BLD AUTO: 4.63 X10*3/UL (ref 1.2–7.7)
NEUTROPHILS NFR BLD AUTO: 78.8 %
NRBC BLD-RTO: 0 /100 WBCS (ref 0–0)
PLATELET # BLD AUTO: 154 X10*3/UL (ref 150–450)
POTASSIUM SERPL-SCNC: 3.7 MMOL/L (ref 3.5–5.3)
PROT SERPL-MCNC: 7.9 G/DL (ref 6.4–8.2)
RBC # BLD AUTO: 3.93 X10*6/UL (ref 4.5–5.9)
SODIUM SERPL-SCNC: 139 MMOL/L (ref 136–145)
WBC # BLD AUTO: 5.9 X10*3/UL (ref 4.4–11.3)

## 2024-09-16 PROCEDURE — 99281 EMR DPT VST MAYX REQ PHY/QHP: CPT

## 2024-09-16 PROCEDURE — 36415 COLL VENOUS BLD VENIPUNCTURE: CPT | Performed by: EMERGENCY MEDICINE

## 2024-09-16 PROCEDURE — 85025 COMPLETE CBC W/AUTO DIFF WBC: CPT | Performed by: EMERGENCY MEDICINE

## 2024-09-16 PROCEDURE — 96361 HYDRATE IV INFUSION ADD-ON: CPT

## 2024-09-16 PROCEDURE — 80053 COMPREHEN METABOLIC PANEL: CPT | Performed by: EMERGENCY MEDICINE

## 2024-09-16 ASSESSMENT — COLUMBIA-SUICIDE SEVERITY RATING SCALE - C-SSRS
1. IN THE PAST MONTH, HAVE YOU WISHED YOU WERE DEAD OR WISHED YOU COULD GO TO SLEEP AND NOT WAKE UP?: NO
6. HAVE YOU EVER DONE ANYTHING, STARTED TO DO ANYTHING, OR PREPARED TO DO ANYTHING TO END YOUR LIFE?: NO
2. HAVE YOU ACTUALLY HAD ANY THOUGHTS OF KILLING YOURSELF?: NO

## 2024-09-16 ASSESSMENT — PAIN SCALES - GENERAL: PAINLEVEL_OUTOF10: 0 - NO PAIN

## 2024-09-16 ASSESSMENT — PAIN - FUNCTIONAL ASSESSMENT: PAIN_FUNCTIONAL_ASSESSMENT: 0-10

## 2024-09-24 ENCOUNTER — OFFICE VISIT (OUTPATIENT)
Dept: PRIMARY CARE | Facility: CLINIC | Age: 69
End: 2024-09-24
Payer: COMMERCIAL

## 2024-09-24 ENCOUNTER — LAB (OUTPATIENT)
Dept: LAB | Facility: LAB | Age: 69
End: 2024-09-24
Payer: COMMERCIAL

## 2024-09-24 VITALS
WEIGHT: 147 LBS | BODY MASS INDEX: 21.09 KG/M2 | HEART RATE: 65 BPM | SYSTOLIC BLOOD PRESSURE: 136 MMHG | DIASTOLIC BLOOD PRESSURE: 86 MMHG

## 2024-09-24 DIAGNOSIS — I10 BENIGN ESSENTIAL HYPERTENSION: Primary | ICD-10-CM

## 2024-09-24 DIAGNOSIS — I10 HYPERTENSION, UNSPECIFIED TYPE: ICD-10-CM

## 2024-09-24 DIAGNOSIS — Z00.00 HEALTH CARE MAINTENANCE: Primary | ICD-10-CM

## 2024-09-24 DIAGNOSIS — M62.838 MUSCLE SPASM: ICD-10-CM

## 2024-09-24 DIAGNOSIS — Z00.00 HEALTH CARE MAINTENANCE: ICD-10-CM

## 2024-09-24 DIAGNOSIS — E78.5 DYSLIPIDEMIA: ICD-10-CM

## 2024-09-24 DIAGNOSIS — Z23 NEED FOR VACCINATION: ICD-10-CM

## 2024-09-24 LAB
ALBUMIN SERPL BCP-MCNC: 4.2 G/DL (ref 3.4–5)
ANION GAP SERPL CALC-SCNC: 9 MMOL/L (ref 10–20)
BUN SERPL-MCNC: 16 MG/DL (ref 6–23)
CALCIUM SERPL-MCNC: 9.2 MG/DL (ref 8.6–10.6)
CHLORIDE SERPL-SCNC: 104 MMOL/L (ref 98–107)
CO2 SERPL-SCNC: 30 MMOL/L (ref 21–32)
CREAT SERPL-MCNC: 1.27 MG/DL (ref 0.5–1.3)
CREAT UR-MCNC: 114.6 MG/DL (ref 20–370)
EGFRCR SERPLBLD CKD-EPI 2021: 61 ML/MIN/1.73M*2
ERYTHROCYTE [DISTWIDTH] IN BLOOD BY AUTOMATED COUNT: 11.4 % (ref 11.5–14.5)
GLUCOSE SERPL-MCNC: 92 MG/DL (ref 74–99)
HCT VFR BLD AUTO: 33.9 % (ref 41–52)
HGB BLD-MCNC: 11.2 G/DL (ref 13.5–17.5)
MCH RBC QN AUTO: 31.1 PG (ref 26–34)
MCHC RBC AUTO-ENTMCNC: 33 G/DL (ref 32–36)
MCV RBC AUTO: 94 FL (ref 80–100)
MEV IGG SER QL IA: POSITIVE
MICROALBUMIN UR-MCNC: <7 MG/L
MICROALBUMIN/CREAT UR: NORMAL MG/G{CREAT}
NRBC BLD-RTO: 0 /100 WBCS (ref 0–0)
PHOSPHATE SERPL-MCNC: 2.9 MG/DL (ref 2.5–4.9)
PLATELET # BLD AUTO: 318 X10*3/UL (ref 150–450)
POTASSIUM SERPL-SCNC: 4.4 MMOL/L (ref 3.5–5.3)
RBC # BLD AUTO: 3.6 X10*6/UL (ref 4.5–5.9)
RUBEOLA IGG ANTIBODY INDEX: 2.6 IA
RUBV IGG SERPL IA-ACNC: 6.1 IA
RUBV IGG SERPL QL IA: POSITIVE
SODIUM SERPL-SCNC: 139 MMOL/L (ref 136–145)
WBC # BLD AUTO: 3.5 X10*3/UL (ref 4.4–11.3)

## 2024-09-24 PROCEDURE — 3079F DIAST BP 80-89 MM HG: CPT | Performed by: INTERNAL MEDICINE

## 2024-09-24 PROCEDURE — 3075F SYST BP GE 130 - 139MM HG: CPT | Performed by: INTERNAL MEDICINE

## 2024-09-24 PROCEDURE — 1159F MED LIST DOCD IN RCRD: CPT | Performed by: INTERNAL MEDICINE

## 2024-09-24 PROCEDURE — 1160F RVW MEDS BY RX/DR IN RCRD: CPT | Performed by: INTERNAL MEDICINE

## 2024-09-24 PROCEDURE — 86735 MUMPS ANTIBODY: CPT

## 2024-09-24 PROCEDURE — 82043 UR ALBUMIN QUANTITATIVE: CPT

## 2024-09-24 PROCEDURE — 82570 ASSAY OF URINE CREATININE: CPT

## 2024-09-24 PROCEDURE — 99213 OFFICE O/P EST LOW 20 MIN: CPT | Performed by: INTERNAL MEDICINE

## 2024-09-24 PROCEDURE — G0008 ADMIN INFLUENZA VIRUS VAC: HCPCS | Performed by: INTERNAL MEDICINE

## 2024-09-24 PROCEDURE — 86481 TB AG RESPONSE T-CELL SUSP: CPT

## 2024-09-24 PROCEDURE — 86765 RUBEOLA ANTIBODY: CPT

## 2024-09-24 PROCEDURE — 90662 IIV NO PRSV INCREASED AG IM: CPT | Performed by: INTERNAL MEDICINE

## 2024-09-24 PROCEDURE — G2211 COMPLEX E/M VISIT ADD ON: HCPCS | Performed by: INTERNAL MEDICINE

## 2024-09-24 PROCEDURE — 85027 COMPLETE CBC AUTOMATED: CPT

## 2024-09-24 PROCEDURE — 1123F ACP DISCUSS/DSCN MKR DOCD: CPT | Performed by: INTERNAL MEDICINE

## 2024-09-24 PROCEDURE — 1036F TOBACCO NON-USER: CPT | Performed by: INTERNAL MEDICINE

## 2024-09-24 PROCEDURE — 36415 COLL VENOUS BLD VENIPUNCTURE: CPT

## 2024-09-24 PROCEDURE — 80069 RENAL FUNCTION PANEL: CPT

## 2024-09-24 PROCEDURE — 86317 IMMUNOASSAY INFECTIOUS AGENT: CPT

## 2024-09-24 RX ORDER — TIZANIDINE 2 MG/1
2 TABLET ORAL EVERY 8 HOURS PRN
Qty: 21 TABLET | Refills: 0 | Status: SHIPPED | OUTPATIENT
Start: 2024-09-24 | End: 2024-10-01

## 2024-09-24 NOTE — PROGRESS NOTES
Subjective   Patient ID: Arturo Gomes is a 69 y.o. male who presents for Hospital Follow-up.    HPI     Patient is a 69-year-old male with past medical history of dyslipidemia and hypertension who presents for follow-up.  Patient needs a form filled out for his employment.  He is up-to-date on his tetanus vaccine and needs verification of measles mumps and rubella.    He did have an incident where he hit his head on a cabinet recently and injured his head.  CT imaging in the emergency room showed no evidence of brain bleed.    Subsequently discharged.  Overall he feels much improved since the ER visit.  No physical impairment    Review of Systems  Constitutional: No fever or chills  Cardiovascular: no chest pain, no palpitations and no syncope.   Respiratory: no cough, no shortness of breath during exertion and no shortness of breath at rest.   Gastrointestinal: no abdominal pain, no nausea and no vomiting.  Neuro: No Headache, no dizziness    Objective   /86   Pulse 65   Wt 66.7 kg (147 lb)   BMI 21.09 kg/m²     Physical Exam  Constitutional: Alert and in no acute distress. Well developed, well nourished  Head and Face: Head and face: Normal.    Cardiovascular: Heart rate and rhythm were normal, normal S1 and S2. No peripheral edema.   Pulmonary: No respiratory distress. Clear bilateral breath sounds.  Musculoskeletal: Gait and station: Normal. Muscle strength/tone: Normal.   Skin: Normal skin color and pigmentation, normal skin turgor, and no rash.    Psychiatric: Judgment and insight: Intact. Mood and affect: Normal.    Procedures    Lab Results   Component Value Date    WBC 5.9 09/16/2024    HGB 11.5 (L) 09/16/2024    HCT 36.2 (L) 09/16/2024     09/16/2024    CHOL 124 05/08/2023    TRIG 53 05/08/2023    HDL 46.0 05/08/2023    ALT 21 09/16/2024    AST 28 09/16/2024     09/16/2024    K 3.7 09/16/2024     09/16/2024    CREATININE 1.28 09/16/2024    BUN 13 09/16/2024    CO2 25  09/16/2024    TSH 2.33 05/08/2023       CT head wo IV contrast  Narrative: Interpreted By:  Sanford Benson and Beyersdorf Conner   STUDY:  CT HEAD WO IV CONTRAST;  9/15/2024 10:03 pm      INDICATION:  Signs/Symptoms:head injury, syncope.          COMPARISON:  CT head 08/21/2019      ACCESSION NUMBER(S):  [id number]      ORDERING CLINICIAN:  KYLEIGH DÍAZ      TECHNIQUE:  Noncontrast axial CT scan of head was performed. Angled reformats in  brain and bone windows were generated. The images were reviewed in  bone, brain, blood and soft tissue windows.      FINDINGS:  CSF Spaces: The ventricles, sulci and basal cisterns are age  concordant. Incidental note of cavum septum pellucidum. There is no  extraaxial fluid collection.      Parenchyma: Mild parenchymal atrophy. Scattered areas of  hypoattenuating periventricular and subcortical white matter, which  is nonspecific, but is consistent with chronic small-vessel ischemic  disease in the patient of this age. The grey-white differentiation is  intact. There is no mass effect or midline shift.  There is no  intracranial hemorrhage.      Calvarium: There is a soft tissue skin defect with subcutaneous  swelling of the left temporal bone without subjacent calvarial  fracture. Calvarium is unremarkable.      Paranasal sinuses and mastoids: Bilateral maxillary as well as  ethmoid sinus mucosal thickening extending into the frontal sinuses      Impression: 1.  No evidence of intracranial hemorrhage or displaced skull  fracture.  2. Findings consistent with chronic small-vessel ischemic disease.  3. Paranasal sinus mucosal thickening.          I personally reviewed the image(s)/study and resident interpretation.  I agree with the findings as stated by resident Samuel De Los Santos.  Data analyzed and images interpreted at [facility], [place], [place].      MACRO:  None      Signed by: Sanford Benson 9/16/2024 12:17 AM  Dictation workstation:    [id number]            Assessment/Plan   Problem List Items Addressed This Visit             ICD-10-CM    Benign essential hypertension - Primary I10     Controlled on current medications.  No medication adjustments         Dyslipidemia E78.5     Advised Mediterranean diet.  Check LDL next visit.  Continue statin.  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

## 2024-09-24 NOTE — LETTER
September 24, 2024     Patient: Arturo Gomes   YOB: 1955   Date of Visit: 9/24/2024       To Whom It May Concern:    Arturo Gomes was seen in my clinic on 9/24/2024 at 11:45 am.Patient may return to work on 9/25/24 without restriction.     If you have any questions or concerns, please don't hesitate to call.         Sincerely,         Larry Ansari DO        CC: No Recipients

## 2024-09-25 LAB
MUMPS IGG ANTIBODY INDEX: 6.4 IA
MUV IGG SER IA-ACNC: POSITIVE

## 2024-09-26 LAB
NIL(NEG) CONTROL SPOT COUNT: NORMAL
PANEL A SPOT COUNT: 0
PANEL B SPOT COUNT: 2
POS CONTROL SPOT COUNT: NORMAL
T-SPOT. TB INTERPRETATION: NEGATIVE

## 2024-10-01 ENCOUNTER — APPOINTMENT (OUTPATIENT)
Dept: PRIMARY CARE | Facility: CLINIC | Age: 69
End: 2024-10-01
Payer: COMMERCIAL

## 2024-10-21 ENCOUNTER — TELEPHONE (OUTPATIENT)
Dept: PRIMARY CARE | Facility: CLINIC | Age: 69
End: 2024-10-21
Payer: COMMERCIAL

## 2024-10-21 DIAGNOSIS — J45.20 MILD INTERMITTENT ASTHMA WITHOUT COMPLICATION (HHS-HCC): ICD-10-CM

## 2024-10-21 RX ORDER — ALBUTEROL SULFATE 90 UG/1
2 INHALANT RESPIRATORY (INHALATION) EVERY 6 HOURS PRN
Qty: 8.5 G | Refills: 0 | Status: SHIPPED | OUTPATIENT
Start: 2024-10-21

## 2024-11-07 ENCOUNTER — HOSPITAL ENCOUNTER (EMERGENCY)
Facility: HOSPITAL | Age: 69
Discharge: HOME | End: 2024-11-07
Attending: EMERGENCY MEDICINE
Payer: COMMERCIAL

## 2024-11-07 ENCOUNTER — OFFICE VISIT (OUTPATIENT)
Dept: PRIMARY CARE | Facility: CLINIC | Age: 69
End: 2024-11-07
Payer: COMMERCIAL

## 2024-11-07 ENCOUNTER — APPOINTMENT (OUTPATIENT)
Dept: RADIOLOGY | Facility: HOSPITAL | Age: 69
End: 2024-11-07
Payer: COMMERCIAL

## 2024-11-07 VITALS
HEIGHT: 70 IN | TEMPERATURE: 97.5 F | BODY MASS INDEX: 21.05 KG/M2 | HEART RATE: 80 BPM | OXYGEN SATURATION: 96 % | DIASTOLIC BLOOD PRESSURE: 98 MMHG | RESPIRATION RATE: 20 BRPM | WEIGHT: 147 LBS | SYSTOLIC BLOOD PRESSURE: 133 MMHG

## 2024-11-07 DIAGNOSIS — J45.41 MODERATE PERSISTENT ASTHMA WITH EXACERBATION (HHS-HCC): Primary | ICD-10-CM

## 2024-11-07 LAB — SARS-COV-2 RNA RESP QL NAA+PROBE: NOT DETECTED

## 2024-11-07 PROCEDURE — 99284 EMERGENCY DEPT VISIT MOD MDM: CPT

## 2024-11-07 PROCEDURE — 1160F RVW MEDS BY RX/DR IN RCRD: CPT | Performed by: INTERNAL MEDICINE

## 2024-11-07 PROCEDURE — 96375 TX/PRO/DX INJ NEW DRUG ADDON: CPT

## 2024-11-07 PROCEDURE — 2500000002 HC RX 250 W HCPCS SELF ADMINISTERED DRUGS (ALT 637 FOR MEDICARE OP, ALT 636 FOR OP/ED): Performed by: EMERGENCY MEDICINE

## 2024-11-07 PROCEDURE — 99215 OFFICE O/P EST HI 40 MIN: CPT | Performed by: INTERNAL MEDICINE

## 2024-11-07 PROCEDURE — 71045 X-RAY EXAM CHEST 1 VIEW: CPT

## 2024-11-07 PROCEDURE — 94640 AIRWAY INHALATION TREATMENT: CPT

## 2024-11-07 PROCEDURE — 1123F ACP DISCUSS/DSCN MKR DOCD: CPT | Performed by: INTERNAL MEDICINE

## 2024-11-07 PROCEDURE — 96365 THER/PROPH/DIAG IV INF INIT: CPT

## 2024-11-07 PROCEDURE — 2500000004 HC RX 250 GENERAL PHARMACY W/ HCPCS (ALT 636 FOR OP/ED): Performed by: EMERGENCY MEDICINE

## 2024-11-07 PROCEDURE — 1036F TOBACCO NON-USER: CPT | Performed by: INTERNAL MEDICINE

## 2024-11-07 PROCEDURE — 1159F MED LIST DOCD IN RCRD: CPT | Performed by: INTERNAL MEDICINE

## 2024-11-07 PROCEDURE — 87635 SARS-COV-2 COVID-19 AMP PRB: CPT | Performed by: EMERGENCY MEDICINE

## 2024-11-07 RX ORDER — MAGNESIUM SULFATE HEPTAHYDRATE 40 MG/ML
2 INJECTION, SOLUTION INTRAVENOUS ONCE
Status: COMPLETED | OUTPATIENT
Start: 2024-11-07 | End: 2024-11-07

## 2024-11-07 RX ORDER — IPRATROPIUM BROMIDE AND ALBUTEROL SULFATE 2.5; .5 MG/3ML; MG/3ML
3 SOLUTION RESPIRATORY (INHALATION) ONCE
Status: COMPLETED | OUTPATIENT
Start: 2024-11-07 | End: 2024-11-07

## 2024-11-07 RX ORDER — PREDNISONE 20 MG/1
40 TABLET ORAL DAILY
Qty: 10 TABLET | Refills: 0 | Status: SHIPPED | OUTPATIENT
Start: 2024-11-07 | End: 2024-11-12

## 2024-11-07 RX ORDER — ALBUTEROL SULFATE 0.83 MG/ML
3 SOLUTION RESPIRATORY (INHALATION)
Status: DISCONTINUED | OUTPATIENT
Start: 2024-11-07 | End: 2024-11-07 | Stop reason: HOSPADM

## 2024-11-07 ASSESSMENT — PAIN SCALES - GENERAL: PAINLEVEL_OUTOF10: 0 - NO PAIN

## 2024-11-07 ASSESSMENT — PAIN - FUNCTIONAL ASSESSMENT: PAIN_FUNCTIONAL_ASSESSMENT: 0-10

## 2024-11-07 NOTE — ED PROVIDER NOTES
HPI   Chief Complaint   Patient presents with    Shortness of Breath       HPI: []  69-year-old  male history of hypertension asthma comes in with a asthma flareup.  Has a cough nonproductive.  No chest pain pressure heaviness.  No hemoptysis no orthopnea PND.  No headache vision change.  No recent travel hospitalization.  No recent intubations.  No recent ICU admissions.    Past history: Asthma, hypertension  Social: Patient denies current tobacco alcohol drug abuse.  REVIEW OF SYSTEMS:    GENERAL.: No weight loss, fatigue, anorexia, insomnia, fever.    EYES: No vision loss, double vision, drainage, eye pain.    ENT: No pharyngitis, dry mouth.    CARDIOPULMONARY: No chest pain, palpitations, syncope, near syncope.  Positive for shortness of breath, positive for cough, hemoptysis.    GI: No abdominal pain, change in bowel habits, melena, hematemesis, hematochezia, nausea, vomiting, diarrhea.    : No discharge, dysuria, frequency, urgency, hematuria.    MS: No limb pain, joint pain, joint swelling.    SKIN: No rashes.    PSYCH: No depression, anxiety, suicidality, homicidality.    Review of systems is otherwise negative unless stated above or in history of present illness.  Social history, family history, allergies reviewed.  PHYSICAL EXAM:    GENERAL: Vitals noted, moderate distress. Alert and oriented  x 3. Non-toxic.      EENT: TMs clear. Posterior oropharynx unremarkable. No meningismus. No LAD.     NECK: Supple. Nontender. No midline tenderness.     CARDIAC: Regular, rate, rhythm. No murmurs rubs or gallops. No JVD    PULMONARY: Mild respiratory distress.  Tachypneic, audible wheezing or rhonchi bilaterally along fields, no crackles no rails able to speak in full sentences not tripoding.    ABDOMEN: Soft, nonsurgical. Nontender. No peritoneal signs. Normoactive bowel sounds. No pulsatile masses.     EXTREMITIES: No peripheral edema. Negative Homans bilaterally, no cords.  2+ bounding pulses  well-perfused.    SKIN: No rash. Intact.     NEURO: No focal neurologic deficits, NIH score of 0. Cranial nerves normal as tested from II through XII.     MEDICAL DECISION MAKING:  EKG on my interpretation shows a normal sinus rhythm normal axis rate mid 70s with no ischemic change.  Chest x-ray is negative, COVID-negative.    Treatment ED: IV established given IV Solu-Medrol multiple nebulizer treatments.  ED course: Repeat assessment feeling much better had some residual open audible rhonchi but wants to go home.    Impression: Acute as asthma exacerbation    Plan set MDM: 69-year-old  male history of asthma not a smoker comes in with a cough and shortness of breath appears to have a asthma exacerbation low concern for pneumonia pulm edema congestive heart failure STEMI NSTEMI, patient be discharged home with the prednisone for 5 more days 40 mg daily continue home dilators close outpatient follow recommended with strict return precaution.                    Patient History   Past Medical History:   Diagnosis Date    Asthma     Hypertension      No past surgical history on file.  No family history on file.  Social History     Tobacco Use    Smoking status: Never     Passive exposure: Never    Smokeless tobacco: Never   Vaping Use    Vaping status: Never Used   Substance Use Topics    Alcohol use: Never    Drug use: Never       Physical Exam   ED Triage Vitals [11/07/24 0906]   Temperature Heart Rate Respirations BP   36.4 °C (97.5 °F) 88 (!) 22 (!) 137/95      Pulse Ox Temp Source Heart Rate Source Patient Position   (!) 93 % Temporal -- --      BP Location FiO2 (%)     -- --       Physical Exam      ED Course & [facility]   ED Course as of 11/07/24 1359   Thu Nov 07, 2024   1356 Patient's chest x-ray is negative, COVID is negative, patient received IV Solu-Medrol IV magnesium sulfate multiple nebulizer treatments repeat evaluated much better still has audible rhonchi but wants to go home will be discharged home  with prednisone, advised continue home bronchodilators close outpatient follow-up recommended with strict return precaution. [MT]      ED Course User Index  [MT] Soni Bullock MD         Diagnoses as of 11/07/24 1359   Moderate persistent asthma with exacerbation ([facility])                 No data recorded     Siva Coma Scale Score: 15 (11/07/24 0958 : Katerine Ash, MAURY)                           Medical Decision Making      Procedure  Procedures     Soni Bullock MD  11/07/24 4005

## 2024-11-07 NOTE — ASSESSMENT & PLAN NOTE
Severe asthma exacerbation with pulse ox of 87% with labored breathing.  Patient needs to go to the emergency room.  EMS called and patient will be taken to [facility] evaluation and treatment.  Patient to follow-up after discharge

## 2024-11-07 NOTE — PROGRESS NOTES
Subjective   Patient ID: Arturo Gomes is a 69 y.o. male who presents for No chief complaint on file..    HPI     Patient is a 69-year-old male who has a past medical history of migraine headaches and mild intermittent asthma presents with chief complaint of shortness of breath.  Patient presents as a walk-in.  Patient was brought to the office by his wife and dropped off.  His wife had left.  Patient states that the symptoms have been going on for 4 days.  His pulse ox on arrival is 87%.  Oxygen 2 L was placed and pulse ox crept up to 95%.  He has labored breathing.  Audible wheezing.  No fevers.    Review of Systems  Constitutional: No fever or chills  Cardiovascular: no chest pain, no palpitations and no syncope.   Respiratory: cough, shortness of breath during exertion and shortness of breath at rest.   Gastrointestinal: no abdominal pain, no nausea and no vomiting.  Neuro: No Headache, no dizziness    Objective   There were no vitals taken for this visit.    Physical Exam  Constitutional: Alert and in no acute distress. Well developed, well nourished  Head and Face: Head and face: Normal.    Cardiovascular: Heart rate and rhythm were normal, normal S1 and S2. No peripheral edema.   Pulmonary: Diffuse wheezing throughout.  Labored breathing.  Musculoskeletal: Gait and station: Normal. Muscle strength/tone: Normal.   Skin: Normal skin color and pigmentation, normal skin turgor, and no rash.    Psychiatric: Judgment and insight: Intact. Mood and affect: Normal.    Procedures    Lab Results   Component Value Date    WBC 3.5 (L) 09/24/2024    HGB 11.2 (L) 09/24/2024    HCT 33.9 (L) 09/24/2024     09/24/2024    CHOL 124 05/08/2023    TRIG 53 05/08/2023    HDL 46.0 05/08/2023    ALT 21 09/16/2024    AST 28 09/16/2024     09/24/2024    K 4.4 09/24/2024     09/24/2024    CREATININE 1.27 09/24/2024    BUN 16 09/24/2024    CO2 30 09/24/2024    TSH 2.33 05/08/2023       CT head wo IV  contrast  Narrative: Interpreted By:  Sanford Benson and Beyersdorf Conner   STUDY:  CT HEAD WO IV CONTRAST;  9/15/2024 10:03 pm      INDICATION:  Signs/Symptoms:head injury, syncope.          COMPARISON:  CT head 08/21/2019      ACCESSION NUMBER(S):  [id number]      ORDERING CLINICIAN:  KYLEIGH DÍAZ      TECHNIQUE:  Noncontrast axial CT scan of head was performed. Angled reformats in  brain and bone windows were generated. The images were reviewed in  bone, brain, blood and soft tissue windows.      FINDINGS:  CSF Spaces: The ventricles, sulci and basal cisterns are age  concordant. Incidental note of cavum septum pellucidum. There is no  extraaxial fluid collection.      Parenchyma: Mild parenchymal atrophy. Scattered areas of  hypoattenuating periventricular and subcortical white matter, which  is nonspecific, but is consistent with chronic small-vessel ischemic  disease in the patient of this age. The grey-white differentiation is  intact. There is no mass effect or midline shift.  There is no  intracranial hemorrhage.      Calvarium: There is a soft tissue skin defect with subcutaneous  swelling of the left temporal bone without subjacent calvarial  fracture. Calvarium is unremarkable.      Paranasal sinuses and mastoids: Bilateral maxillary as well as  ethmoid sinus mucosal thickening extending into the frontal sinuses      Impression: 1.  No evidence of intracranial hemorrhage or displaced skull  fracture.  2. Findings consistent with chronic small-vessel ischemic disease.  3. Paranasal sinus mucosal thickening.          I personally reviewed the image(s)/study and resident interpretation.  I agree with the findings as stated by resident Samuel De Los Santos.  Data analyzed and images interpreted at [facility], [place], [place].      MACRO:  None      Signed by: Sanford Benson 9/16/2024 12:17 AM  Dictation workstation:   [id number]            Assessment/Plan   Problem List  Items Addressed This Visit             ICD-10-CM    Moderate persistent asthma with exacerbation ([facility]) - Primary J45.41     Severe asthma exacerbation with pulse ox of 87% with labored breathing.  Patient needs to go to the emergency room.  EMS called and patient will be taken to [facility] evaluation and treatment.  Patient to follow-up after discharge

## 2024-11-07 NOTE — ED TRIAGE NOTES
Pt coming in today for an asthma exacerbation that started on Sunday. Went to see his primary care dr where he was 84% on RA and placed on 3L. HE has a hx of asthma and has been taking his meds at home as needed. Given a breathing treatment in route and feels slightly better.

## 2024-11-12 ENCOUNTER — TELEPHONE (OUTPATIENT)
Dept: PRIMARY CARE | Facility: CLINIC | Age: 69
End: 2024-11-12
Payer: COMMERCIAL

## 2024-11-19 ENCOUNTER — APPOINTMENT (OUTPATIENT)
Dept: PRIMARY CARE | Facility: CLINIC | Age: 69
End: 2024-11-19
Payer: COMMERCIAL

## 2024-11-19 VITALS
DIASTOLIC BLOOD PRESSURE: 88 MMHG | SYSTOLIC BLOOD PRESSURE: 134 MMHG | HEART RATE: 57 BPM | WEIGHT: 138 LBS | BODY MASS INDEX: 19.8 KG/M2 | OXYGEN SATURATION: 96 %

## 2024-11-19 DIAGNOSIS — J45.20 MILD INTERMITTENT ASTHMA, UNSPECIFIED WHETHER COMPLICATED (HHS-HCC): Primary | ICD-10-CM

## 2024-11-19 PROCEDURE — G2211 COMPLEX E/M VISIT ADD ON: HCPCS | Performed by: INTERNAL MEDICINE

## 2024-11-19 PROCEDURE — 3075F SYST BP GE 130 - 139MM HG: CPT | Performed by: INTERNAL MEDICINE

## 2024-11-19 PROCEDURE — 3079F DIAST BP 80-89 MM HG: CPT | Performed by: INTERNAL MEDICINE

## 2024-11-19 PROCEDURE — 1159F MED LIST DOCD IN RCRD: CPT | Performed by: INTERNAL MEDICINE

## 2024-11-19 PROCEDURE — 1123F ACP DISCUSS/DSCN MKR DOCD: CPT | Performed by: INTERNAL MEDICINE

## 2024-11-19 PROCEDURE — 1160F RVW MEDS BY RX/DR IN RCRD: CPT | Performed by: INTERNAL MEDICINE

## 2024-11-19 PROCEDURE — 1036F TOBACCO NON-USER: CPT | Performed by: INTERNAL MEDICINE

## 2024-11-19 PROCEDURE — 99213 OFFICE O/P EST LOW 20 MIN: CPT | Performed by: INTERNAL MEDICINE

## 2024-11-19 RX ORDER — FLUTICASONE PROPIONATE AND SALMETEROL 250; 50 UG/1; UG/1
1 POWDER RESPIRATORY (INHALATION)
Qty: 60 EACH | Refills: 11 | Status: SHIPPED | OUTPATIENT
Start: 2024-11-19 | End: 2025-11-19

## 2024-11-19 NOTE — PROGRESS NOTES
Subjective   Patient ID: Arturo Gomes is a 69 y.o. male who presents for Follow-up.    HPI     Patient is a 69-year-old male with past medical history of hypertension, kidney disease stage III and asthma who presents as follow-up from recent ER visit due to asthma exacerbation.  Patient was treated with DuoNebs and steroids.  Subsequently discharged.  Overall doing well.  No shortness of breath at this time.  No fevers or coughing.  Back to baseline.  Using albuterol as needed.  Not on any maintenance medications.    Review of Systems  Constitutional: No fever or chills  Cardiovascular: no chest pain, no palpitations and no syncope.   Respiratory: no cough, no shortness of breath during exertion and no shortness of breath at rest.   Gastrointestinal: no abdominal pain, no nausea and no vomiting.  Neuro: No Headache, no dizziness    Objective   /88   Pulse 57   Wt 62.6 kg (138 lb)   SpO2 96%   BMI 19.80 kg/m²     Physical Exam  Constitutional: Alert and in no acute distress. Well developed, well nourished  Head and Face: Head and face: Normal.    Cardiovascular: Heart rate and rhythm were normal, normal S1 and S2. No peripheral edema.   Pulmonary: No respiratory distress. Clear bilateral breath sounds.  Musculoskeletal: Gait and station: Normal. Muscle strength/tone: Normal.   Skin: Normal skin color and pigmentation, normal skin turgor, and no rash.    Psychiatric: Judgment and insight: Intact. Mood and affect: Normal.    Procedures    Lab Results   Component Value Date    WBC 3.5 (L) 09/24/2024    HGB 11.2 (L) 09/24/2024    HCT 33.9 (L) 09/24/2024     09/24/2024    CHOL 124 05/08/2023    TRIG 53 05/08/2023    HDL 46.0 05/08/2023    ALT 21 09/16/2024    AST 28 09/16/2024     09/24/2024    K 4.4 09/24/2024     09/24/2024    CREATININE 1.27 09/24/2024    BUN 16 09/24/2024    CO2 30 09/24/2024    TSH 2.33 05/08/2023       XR chest 1 view  Narrative: STUDY:  Chest Radiograph;  11/07/2024  at 10:04  INDICATION:  Shortness of breath.  COMPARISON:  XR chest 09/15/2024  ACCESSION NUMBER(S):  [id number]  ORDERING CLINICIAN:  MARIA DEL CARMEN KIRKPATRICK  TECHNIQUE:  Frontal chest was obtained at 10:04 hours.  FINDINGS:  No acute opacities or effusions are visualized.  Heart size is top  normal.  There is no evidence of a pneumothorax.  Impression: 1.  No acute findings on single AP view of the chest.  Signed by Winston Whiteside MD            Assessment/Plan   Problem List Items Addressed This Visit             ICD-10-CM    Asthma - Primary J45.909     Exacerbation resolved.  Continue albuterol as needed.  Will provide maintenance Advair 1 puff twice daily.  Rinse mouth after use.  Avoid triggers.  Follow-up 3 months for reevaluation.  Will provide work slip for return to work.         Relevant Medications    fluticasone propion-salmeteroL (Advair Diskus) 250-50 mcg/dose diskus inhaler    Other Relevant Orders    Follow Up In [facility] - PCP - Medicare Annual

## 2024-11-19 NOTE — LETTER
November 19, 2024     Patient: Arturo Gomes   YOB: 1955   Date of Visit: 11/19/2024       To Whom It May Concern:    Arturo Gomes was seen in my clinic on 11/19/2024 at 11:00 am. He may return to work 1/24/24 without restriction.    If you have any questions or concerns, please don't hesitate to call.1         Sincerely,         Larry Ansari DO        CC: No Recipients

## 2024-11-19 NOTE — ASSESSMENT & PLAN NOTE
Exacerbation resolved.  Continue albuterol as needed.  Will provide maintenance Advair 1 puff twice daily.  Rinse mouth after use.  Avoid triggers.  Follow-up 3 months for reevaluation.  Will provide work slip for return to work.

## 2024-12-17 ENCOUNTER — APPOINTMENT (OUTPATIENT)
Dept: PRIMARY CARE | Facility: CLINIC | Age: 69
End: 2024-12-17
Payer: COMMERCIAL

## 2025-02-19 ENCOUNTER — APPOINTMENT (OUTPATIENT)
Dept: PRIMARY CARE | Facility: CLINIC | Age: 70
End: 2025-02-19
Payer: COMMERCIAL

## 2025-02-19 VITALS
HEART RATE: 87 BPM | WEIGHT: 136 LBS | HEIGHT: 70 IN | OXYGEN SATURATION: 97 % | BODY MASS INDEX: 19.47 KG/M2 | DIASTOLIC BLOOD PRESSURE: 75 MMHG | SYSTOLIC BLOOD PRESSURE: 136 MMHG

## 2025-02-19 DIAGNOSIS — M54.50 LOW BACK PAIN, UNSPECIFIED BACK PAIN LATERALITY, UNSPECIFIED CHRONICITY, UNSPECIFIED WHETHER SCIATICA PRESENT: Primary | ICD-10-CM

## 2025-02-19 DIAGNOSIS — J45.20 MILD INTERMITTENT ASTHMA, UNSPECIFIED WHETHER COMPLICATED (HHS-HCC): ICD-10-CM

## 2025-02-19 PROCEDURE — 1170F FXNL STATUS ASSESSED: CPT | Performed by: INTERNAL MEDICINE

## 2025-02-19 PROCEDURE — 1160F RVW MEDS BY RX/DR IN RCRD: CPT | Performed by: INTERNAL MEDICINE

## 2025-02-19 PROCEDURE — 3075F SYST BP GE 130 - 139MM HG: CPT | Performed by: INTERNAL MEDICINE

## 2025-02-19 PROCEDURE — 1036F TOBACCO NON-USER: CPT | Performed by: INTERNAL MEDICINE

## 2025-02-19 PROCEDURE — 1123F ACP DISCUSS/DSCN MKR DOCD: CPT | Performed by: INTERNAL MEDICINE

## 2025-02-19 PROCEDURE — 3078F DIAST BP <80 MM HG: CPT | Performed by: INTERNAL MEDICINE

## 2025-02-19 PROCEDURE — 1159F MED LIST DOCD IN RCRD: CPT | Performed by: INTERNAL MEDICINE

## 2025-02-19 PROCEDURE — 99213 OFFICE O/P EST LOW 20 MIN: CPT | Performed by: INTERNAL MEDICINE

## 2025-02-19 PROCEDURE — 3008F BODY MASS INDEX DOCD: CPT | Performed by: INTERNAL MEDICINE

## 2025-02-19 RX ORDER — METHOCARBAMOL 500 MG/1
500 TABLET, FILM COATED ORAL 3 TIMES DAILY PRN
Qty: 21 TABLET | Refills: 0 | Status: SHIPPED | OUTPATIENT
Start: 2025-02-19 | End: 2025-02-26

## 2025-02-19 ASSESSMENT — PATIENT HEALTH QUESTIONNAIRE - PHQ9
SUM OF ALL RESPONSES TO PHQ9 QUESTIONS 1 AND 2: 0
2. FEELING DOWN, DEPRESSED OR HOPELESS: NOT AT ALL
1. LITTLE INTEREST OR PLEASURE IN DOING THINGS: NOT AT ALL

## 2025-02-19 ASSESSMENT — ACTIVITIES OF DAILY LIVING (ADL)
TAKING_MEDICATION: INDEPENDENT
DRESSING: INDEPENDENT
DOING_HOUSEWORK: INDEPENDENT
MANAGING_FINANCES: INDEPENDENT
BATHING: INDEPENDENT
GROCERY_SHOPPING: INDEPENDENT

## 2025-02-19 NOTE — PROGRESS NOTES
"Subjective   Patient ID: Arturo Gomes is a 70 y.o. male who presents for follow-up    Urgently scheduled his Medicare wellness but is too early    HPI     Patient is a 70-year-old male with past medical history of chronic kidney disease stage III hypertension asthma who presents with chief complaint of lower back pain.  He states that he fell on the ice and developed lower back pain symptoms began 2 weeks ago.  Pain is dull and achy and nonradiating.    Of note patient does a significant amount of walking.  He walks to the office from [place] which is about 2 miles.  He plans on walking to work which is 3 miles away.  It appears he walks everywhere.  He plans on calling a ride today to get back to work however    Review of Systems  Constitutional: No fever or chills  Cardiovascular: no chest pain, no palpitations and no syncope.   Respiratory: no cough, no shortness of breath during exertion and no shortness of breath at rest.   Gastrointestinal: no abdominal pain, no nausea and no vomiting.  Neuro: No Headache, no dizziness  MSK lower back pain    Objective   /75   Pulse 87   Ht 1.778 m (5' 10\")   Wt 61.7 kg (136 lb)   SpO2 97%   BMI 19.51 kg/m²     Physical Exam  Constitutional: Alert and in no acute distress. Well developed, well nourished  Head and Face: Head and face: Normal.    Cardiovascular: Heart rate and rhythm were normal, normal S1 and S2. No peripheral edema.   Pulmonary: No respiratory distress. Clear bilateral breath sounds.  Musculoskeletal: Gait and station: Normal. Muscle strength/tone: Normal.   Skin: Normal skin color and pigmentation, normal skin turgor, and no rash.    Psychiatric: Judgment and insight: Intact. Mood and affect: Normal.    Procedures    Lab Results   Component Value Date    WBC 3.5 (L) 09/24/2024    HGB 11.2 (L) 09/24/2024    HCT 33.9 (L) 09/24/2024     09/24/2024    CHOL 124 05/08/2023    TRIG 53 05/08/2023    HDL 46.0 05/08/2023    ALT 21 " 09/16/2024    AST 28 09/16/2024     09/24/2024    K 4.4 09/24/2024     09/24/2024    CREATININE 1.27 09/24/2024    BUN 16 09/24/2024    CO2 30 09/24/2024    TSH 2.33 05/08/2023       XR chest 1 view  Narrative: STUDY:  Chest Radiograph;  11/07/2024 at 10:04  INDICATION:  Shortness of breath.  COMPARISON:  XR chest 09/15/2024  ACCESSION NUMBER(S):  [id number]  ORDERING CLINICIAN:  MARIA DEL CARMEN KIRKPATRICK  TECHNIQUE:  Frontal chest was obtained at 10:04 hours.  FINDINGS:  No acute opacities or effusions are visualized.  Heart size is top  normal.  There is no evidence of a pneumothorax.  Impression: 1.  No acute findings on single AP view of the chest.  Signed by Winston Whiteside MD            Assessment/Plan   Problem List Items Addressed This Visit             ICD-10-CM    Asthma J45.909     Other Visit Diagnoses         Codes    Low back pain, unspecified back pain laterality, unspecified chronicity, unspecified whether sciatica present    -  Primary M54.50    Relevant Medications    methocarbamol (Robaxin) 500 mg tablet    Other Relevant Orders    Follow Up In [facility] - PCP - Medicare Annual          Appears musculoskeletal.  Avoid ibuprofen due to chronic kidney disease.  Tylenol as needed.  Will provide methocarbamol.  If no improvement in 1 week consider imaging of the lumbar spine.  Medicare wellness to be scheduled after April 1

## 2025-04-03 ENCOUNTER — APPOINTMENT (OUTPATIENT)
Dept: PRIMARY CARE | Facility: CLINIC | Age: 70
End: 2025-04-03
Payer: COMMERCIAL

## 2025-04-03 VITALS
WEIGHT: 148 LBS | OXYGEN SATURATION: 96 % | SYSTOLIC BLOOD PRESSURE: 129 MMHG | HEART RATE: 66 BPM | HEIGHT: 70 IN | DIASTOLIC BLOOD PRESSURE: 87 MMHG | BODY MASS INDEX: 21.19 KG/M2

## 2025-04-03 DIAGNOSIS — N18.31 STAGE 3A CHRONIC KIDNEY DISEASE (MULTI): ICD-10-CM

## 2025-04-03 DIAGNOSIS — Z13.1 DIABETES MELLITUS SCREENING: ICD-10-CM

## 2025-04-03 DIAGNOSIS — Z00.00 ROUTINE GENERAL MEDICAL EXAMINATION AT HEALTH CARE FACILITY: Primary | ICD-10-CM

## 2025-04-03 DIAGNOSIS — R74.8 ELEVATED CPK: ICD-10-CM

## 2025-04-03 DIAGNOSIS — J45.20 MILD INTERMITTENT ASTHMA WITHOUT COMPLICATION (HHS-HCC): ICD-10-CM

## 2025-04-03 DIAGNOSIS — S06.320A: ICD-10-CM

## 2025-04-03 DIAGNOSIS — M54.50 LOW BACK PAIN, UNSPECIFIED BACK PAIN LATERALITY, UNSPECIFIED CHRONICITY, UNSPECIFIED WHETHER SCIATICA PRESENT: ICD-10-CM

## 2025-04-03 DIAGNOSIS — Z12.5 SCREENING FOR PROSTATE CANCER: ICD-10-CM

## 2025-04-03 DIAGNOSIS — Z29.11 NEED FOR VACCINATION AGAINST RESPIRATORY SYNCYTIAL VIRUS: ICD-10-CM

## 2025-04-03 DIAGNOSIS — Z13.220 SCREENING FOR HYPERLIPIDEMIA: ICD-10-CM

## 2025-04-03 DIAGNOSIS — I10 BENIGN ESSENTIAL HYPERTENSION: ICD-10-CM

## 2025-04-03 PROCEDURE — G0439 PPPS, SUBSEQ VISIT: HCPCS | Performed by: INTERNAL MEDICINE

## 2025-04-03 PROCEDURE — 3074F SYST BP LT 130 MM HG: CPT | Performed by: INTERNAL MEDICINE

## 2025-04-03 PROCEDURE — G8433 SCR FOR DEP NOT CPT DOC RSN: HCPCS | Performed by: INTERNAL MEDICINE

## 2025-04-03 PROCEDURE — 99397 PER PM REEVAL EST PAT 65+ YR: CPT | Performed by: INTERNAL MEDICINE

## 2025-04-03 PROCEDURE — 3008F BODY MASS INDEX DOCD: CPT | Performed by: INTERNAL MEDICINE

## 2025-04-03 PROCEDURE — 3079F DIAST BP 80-89 MM HG: CPT | Performed by: INTERNAL MEDICINE

## 2025-04-03 PROCEDURE — 1170F FXNL STATUS ASSESSED: CPT | Performed by: INTERNAL MEDICINE

## 2025-04-03 PROCEDURE — 1036F TOBACCO NON-USER: CPT | Performed by: INTERNAL MEDICINE

## 2025-04-03 PROCEDURE — 1123F ACP DISCUSS/DSCN MKR DOCD: CPT | Performed by: INTERNAL MEDICINE

## 2025-04-03 PROCEDURE — 1160F RVW MEDS BY RX/DR IN RCRD: CPT | Performed by: INTERNAL MEDICINE

## 2025-04-03 PROCEDURE — 1159F MED LIST DOCD IN RCRD: CPT | Performed by: INTERNAL MEDICINE

## 2025-04-03 RX ORDER — ATORVASTATIN CALCIUM 40 MG/1
40 TABLET, FILM COATED ORAL NIGHTLY
Qty: 90 TABLET | Refills: 3 | Status: SHIPPED | OUTPATIENT
Start: 2025-04-03

## 2025-04-03 RX ORDER — AMLODIPINE BESYLATE 5 MG/1
5 TABLET ORAL DAILY
Qty: 90 TABLET | Refills: 3 | Status: SHIPPED | OUTPATIENT
Start: 2025-04-03

## 2025-04-03 ASSESSMENT — ENCOUNTER SYMPTOMS
DIFFICULTY URINATING: 0
BLOOD IN STOOL: 0
DIAPHORESIS: 0
COUGH: 0
POLYPHAGIA: 0
HYPERACTIVE: 0
FEVER: 0
HEADACHES: 0
JOINT SWELLING: 0
CHOKING: 0
FACIAL SWELLING: 0
POLYDIPSIA: 0
ARTHRALGIAS: 0
CHEST TIGHTNESS: 0
SPEECH DIFFICULTY: 0
CONFUSION: 0
EYE DISCHARGE: 0
APPETITE CHANGE: 0
NUMBNESS: 0
EYE REDNESS: 0
LIGHT-HEADEDNESS: 0
SHORTNESS OF BREATH: 0
BACK PAIN: 0
ABDOMINAL PAIN: 0
NAUSEA: 0
ABDOMINAL DISTENTION: 0
VOMITING: 0
EYE ITCHING: 0
EYE PAIN: 0
ACTIVITY CHANGE: 0
FATIGUE: 0
NERVOUS/ANXIOUS: 0
DYSPHORIC MOOD: 0
AGITATION: 0
FACIAL ASYMMETRY: 0

## 2025-04-03 ASSESSMENT — ACTIVITIES OF DAILY LIVING (ADL)
TAKING_MEDICATION: INDEPENDENT
BATHING: INDEPENDENT
DOING_HOUSEWORK: INDEPENDENT
GROCERY_SHOPPING: INDEPENDENT
MANAGING_FINANCES: INDEPENDENT
DRESSING: INDEPENDENT

## 2025-04-03 ASSESSMENT — PATIENT HEALTH QUESTIONNAIRE - PHQ9
2. FEELING DOWN, DEPRESSED OR HOPELESS: NOT AT ALL
SUM OF ALL RESPONSES TO PHQ9 QUESTIONS 1 AND 2: 0
1. LITTLE INTEREST OR PLEASURE IN DOING THINGS: NOT AT ALL

## 2025-04-03 NOTE — ASSESSMENT & PLAN NOTE
Controlled on current medications.  No medication adjustments.    Orders:    amLODIPine (Norvasc) 5 mg tablet; Take 1 tablet (5 mg) by mouth once daily.    atorvastatin (Lipitor) 40 mg tablet; Take 1 tablet (40 mg) by mouth once daily at bedtime.

## 2025-04-03 NOTE — ASSESSMENT & PLAN NOTE
Check renal function and urine albumin.  Avoid ibuprofen.  Drink at least 1.5 L of fluid daily.  Check labs 6 months    Orders:    atorvastatin (Lipitor) 40 mg tablet; Take 1 tablet (40 mg) by mouth once daily at bedtime.    Renal function panel; Future    CK; Future

## 2025-04-03 NOTE — PROGRESS NOTES
Subjective   Reason for Visit: Arturo Gomes is an 70 y.o. male here for a Medicare Wellness visit.     Past Medical, Surgical, and Family History reviewed and updated in chart.    Reviewed all medications by prescribing practitioner or clinical pharmacist (such as prescriptions, OTCs, herbal therapies and supplements) and documented in the medical record.    HPI    Patient is a 70-year-old male with past medical history of hypertension chronic kidney disease stage III who presents for wellness.  No specific complaints at this time.  He still works at a local institution as a .  He walks everywhere.  Prefers not to drive.  Up-to-date on pneumonia vaccine.  Up-to-date on colonoscopy screening.  States that his lower back pain has resolved.  Lives at home with his wife.  Takes his medications as prescribed.    Patient Care Team:  Larry Ansari DO as PCP - General (Internal Medicine)  Mark Urias MD as Consulting Physician (Hematology and Oncology)     Review of Systems   Constitutional:  Negative for activity change, appetite change, diaphoresis, fatigue and fever.   HENT:  Negative for dental problem, drooling, ear pain, facial swelling, hearing loss and nosebleeds.    Eyes:  Negative for pain, discharge, redness and itching.   Respiratory:  Negative for cough, choking, chest tightness and shortness of breath.    Gastrointestinal:  Negative for abdominal distention, abdominal pain, blood in stool, nausea and vomiting.   Endocrine: Negative for cold intolerance, heat intolerance, polydipsia and polyphagia.   Genitourinary:  Negative for difficulty urinating.   Musculoskeletal:  Negative for arthralgias, back pain and joint swelling.   Allergic/Immunologic: Negative for environmental allergies and food allergies.   Neurological:  Negative for facial asymmetry, speech difficulty, light-headedness, numbness and headaches.   Psychiatric/Behavioral:  Negative for agitation, confusion and dysphoric mood. The  "patient is not nervous/anxious and is not hyperactive.        Objective   Vitals:  /87   Pulse 66   Ht 1.778 m (5' 10\")   Wt 67.1 kg (148 lb)   SpO2 96%   BMI 21.24 kg/m²       Physical Exam  Constitutional:       Appearance: Normal appearance.   HENT:      Head: Normocephalic and atraumatic.      Nose: No congestion or rhinorrhea.      Mouth/Throat:      Mouth: Mucous membranes are moist.   Eyes:      Pupils: Pupils are equal, round, and reactive to light.   Cardiovascular:      Rate and Rhythm: Normal rate and regular rhythm.      Pulses: Normal pulses.      Heart sounds: Normal heart sounds. No murmur heard.     No friction rub. No gallop.   Pulmonary:      Effort: Pulmonary effort is normal. No respiratory distress.      Breath sounds: Normal breath sounds. No stridor. No wheezing or rales.   Abdominal:      General: Abdomen is flat. Bowel sounds are normal. There is no distension.      Palpations: Abdomen is soft. There is no mass.      Tenderness: There is no guarding.      Hernia: No hernia is present.   Musculoskeletal:         General: No swelling. Normal range of motion.      Cervical back: No rigidity.      Right lower leg: No edema.      Left lower leg: No edema.   Skin:     General: Skin is warm and dry.   Neurological:      General: No focal deficit present.      Mental Status: He is alert and oriented to person, place, and time. Mental status is at baseline.      Motor: No weakness.      Gait: Gait normal.   Psychiatric:         Mood and Affect: Mood normal.         Behavior: Behavior normal.         Thought Content: Thought content normal.         Assessment & Plan  Routine general medical examination at health care facility    Orders:    1 Year Follow Up In [facility] - PCP - Wellness Exam; Future    Renal function panel; Future    CK; Future    Low back pain, unspecified back pain laterality, unspecified chronicity, unspecified whether sciatica present    Orders:    Follow Up " In [facility] - PCP - Medicare Annual    Benign essential hypertension  Controlled on current medications.  No medication adjustments.    Orders:    amLODIPine (Norvasc) 5 mg tablet; Take 1 tablet (5 mg) by mouth once daily.    atorvastatin (Lipitor) 40 mg tablet; Take 1 tablet (40 mg) by mouth once daily at bedtime.    Stage 3a chronic kidney disease (Multi)  Check renal function and urine albumin.  Avoid ibuprofen.  Drink at least 1.5 L of fluid daily.  Check labs 6 months    Orders:    atorvastatin (Lipitor) 40 mg tablet; Take 1 tablet (40 mg) by mouth once daily at bedtime.    Renal function panel; Future    CK; Future    Diabetes mellitus screening    Orders:    Urine Microalbumin - Lab collect; Future    Screening for hyperlipidemia    Orders:    Lipid Panel; Future    Screening for prostate cancer    Orders:    PSA screen; Future    Elevated CPK    Orders:    CK; Future    Contusion and laceration of left cerebrum without loss of consciousness, initial encounter (Multi)         Need for vaccination against respiratory syncytial virus    Mild intermittent asthma without complication ([facility])  No recent flares.  Uses albuterol as needed.

## 2025-05-29 NOTE — ED TRIAGE NOTES
Physical Therapy Visit         OBJECTIVE                                                                                                                                       Treatment          {Rehab Synopsis (Optional):074405}    Therapy procedure time and total treatment time can be found documented on the Time Entry flowsheet     Presents with c/o feeling weak. Was seen yesterday after hitting head on cabinet. States he was DC home. Today he was sitting outside and began feeling weak and wobbly on his legs.

## 2025-06-06 LAB
ALBUMIN SERPL-MCNC: 4.5 G/DL (ref 3.6–5.1)
BUN SERPL-MCNC: 22 MG/DL (ref 7–25)
BUN/CREAT SERPL: 15 (CALC) (ref 6–22)
CALCIUM SERPL-MCNC: 9.5 MG/DL (ref 8.6–10.3)
CHLORIDE SERPL-SCNC: 107 MMOL/L (ref 98–110)
CK SERPL-CCNC: 187 U/L (ref 19–278)
CO2 SERPL-SCNC: 24 MMOL/L (ref 20–32)
CREAT SERPL-MCNC: 1.51 MG/DL (ref 0.7–1.28)
EGFRCR SERPLBLD CKD-EPI 2021: 49 ML/MIN/1.73M2
GLUCOSE SERPL-MCNC: 94 MG/DL (ref 65–99)
PHOSPHATE SERPL-MCNC: 4.2 MG/DL (ref 2.1–4.3)
POTASSIUM SERPL-SCNC: 4.6 MMOL/L (ref 3.5–5.3)
SODIUM SERPL-SCNC: 139 MMOL/L (ref 135–146)

## 2025-06-07 LAB
ALBUMIN/CREAT UR: 1 MG/G CREAT
CHOLEST SERPL-MCNC: 219 MG/DL
CHOLEST/HDLC SERPL: 4.2 (CALC)
CREAT UR-MCNC: 210 MG/DL (ref 20–320)
HDLC SERPL-MCNC: 52 MG/DL
LDLC SERPL CALC-MCNC: 147 MG/DL (CALC)
MICROALBUMIN UR-MCNC: 0.3 MG/DL
NONHDLC SERPL-MCNC: 167 MG/DL (CALC)
PSA SERPL-MCNC: 4.3 NG/ML
TRIGL SERPL-MCNC: 93 MG/DL

## 2025-06-13 DIAGNOSIS — I10 BENIGN ESSENTIAL HYPERTENSION: ICD-10-CM

## 2025-06-13 DIAGNOSIS — N18.31 STAGE 3A CHRONIC KIDNEY DISEASE (MULTI): ICD-10-CM

## 2025-06-13 RX ORDER — ATORVASTATIN CALCIUM 80 MG/1
80 TABLET, FILM COATED ORAL DAILY
Qty: 100 TABLET | Refills: 3 | Status: SHIPPED | OUTPATIENT
Start: 2025-06-13 | End: 2026-07-18

## 2025-08-12 ENCOUNTER — OFFICE VISIT (OUTPATIENT)
Dept: PRIMARY CARE | Facility: CLINIC | Age: 70
End: 2025-08-12
Payer: MEDICARE

## 2025-08-12 VITALS — DIASTOLIC BLOOD PRESSURE: 87 MMHG | SYSTOLIC BLOOD PRESSURE: 131 MMHG

## 2025-08-12 DIAGNOSIS — M54.50 LOW BACK PAIN, UNSPECIFIED BACK PAIN LATERALITY, UNSPECIFIED CHRONICITY, UNSPECIFIED WHETHER SCIATICA PRESENT: Primary | ICD-10-CM

## 2025-08-12 PROCEDURE — G2211 COMPLEX E/M VISIT ADD ON: HCPCS | Performed by: INTERNAL MEDICINE

## 2025-08-12 PROCEDURE — 1160F RVW MEDS BY RX/DR IN RCRD: CPT | Performed by: INTERNAL MEDICINE

## 2025-08-12 PROCEDURE — 99213 OFFICE O/P EST LOW 20 MIN: CPT | Performed by: INTERNAL MEDICINE

## 2025-08-12 PROCEDURE — 3079F DIAST BP 80-89 MM HG: CPT | Performed by: INTERNAL MEDICINE

## 2025-08-12 PROCEDURE — 1036F TOBACCO NON-USER: CPT | Performed by: INTERNAL MEDICINE

## 2025-08-12 PROCEDURE — 1159F MED LIST DOCD IN RCRD: CPT | Performed by: INTERNAL MEDICINE

## 2025-08-12 PROCEDURE — 3075F SYST BP GE 130 - 139MM HG: CPT | Performed by: INTERNAL MEDICINE

## 2025-08-12 RX ORDER — METHOCARBAMOL 500 MG/1
500 TABLET, FILM COATED ORAL 3 TIMES DAILY PRN
Qty: 21 TABLET | Refills: 0 | Status: SHIPPED | OUTPATIENT
Start: 2025-08-12 | End: 2025-08-19

## 2025-08-12 RX ORDER — PREDNISONE 20 MG/1
20 TABLET ORAL DAILY
Qty: 3 TABLET | Refills: 0 | Status: SHIPPED | OUTPATIENT
Start: 2025-08-12 | End: 2025-08-15

## 2025-08-12 RX ORDER — MECLIZINE HYDROCHLORIDE 25 MG/1
25 TABLET ORAL 3 TIMES DAILY PRN
Qty: 30 TABLET | Refills: 0 | Status: SHIPPED | OUTPATIENT
Start: 2025-08-12 | End: 2025-08-22

## 2025-10-03 ENCOUNTER — APPOINTMENT (OUTPATIENT)
Dept: PRIMARY CARE | Facility: CLINIC | Age: 70
End: 2025-10-03
Payer: MEDICARE